# Patient Record
Sex: MALE | URBAN - METROPOLITAN AREA
[De-identification: names, ages, dates, MRNs, and addresses within clinical notes are randomized per-mention and may not be internally consistent; named-entity substitution may affect disease eponyms.]

---

## 2017-05-31 ENCOUNTER — TRANSFERRED RECORDS (OUTPATIENT)
Dept: HEALTH INFORMATION MANAGEMENT | Facility: CLINIC | Age: 82
End: 2017-05-31

## 2017-06-27 LAB — TROPONIN I BLD-MCNC: 1.59 UG/L

## 2017-06-28 ENCOUNTER — TRANSFERRED RECORDS (OUTPATIENT)
Dept: HEALTH INFORMATION MANAGEMENT | Facility: CLINIC | Age: 82
End: 2017-06-28

## 2017-06-29 ENCOUNTER — TRANSFERRED RECORDS (OUTPATIENT)
Dept: HEALTH INFORMATION MANAGEMENT | Facility: CLINIC | Age: 82
End: 2017-06-29

## 2017-08-28 LAB — BNP SERPL-MCNC: 725 PG/ML

## 2017-09-19 LAB — HERPES SIMPLEX TYPE 2 - QUEST: NORMAL

## 2017-09-25 LAB
HBA1C MFR BLD: 6.8 % (ref 0–5.7)
T4 FREE SERPL-MCNC: 1 NG/DL
TSH SERPL-ACNC: 5.53 MCU/ML

## 2017-09-26 LAB
ANION GAP SERPL CALCULATED.3IONS-SCNC: 9 MMOL/L
BASOPHILS NFR BLD AUTO: 0 %
BUN SERPL-MCNC: 27 MG/DL
CALCIUM SERPL-MCNC: 9.4 MG/DL
CHLORIDE SERPLBLD-SCNC: 103 MMOL/L
CHOLEST SERPL-MCNC: 103 MG/DL
CO2 SERPL-SCNC: 30 MMOL/L
CREAT SERPL-MCNC: 1.44 MG/DL
EOSINOPHIL NFR BLD AUTO: 4 %
ERYTHROCYTE [DISTWIDTH] IN BLOOD BY AUTOMATED COUNT: 13.1 %
GFR SERPL CREATININE-BSD FRML MDRD: 42 ML/MIN/1.73M2
GLUCOSE SERPL-MCNC: 112 MG/DL (ref 70–99)
HCT VFR BLD AUTO: 43.9 %
HDLC SERPL-MCNC: 28 MG/DL
HEMOGLOBIN: 14.6 G/DL (ref 13.3–17.7)
LDLC SERPL CALC-MCNC: 45 MG/DL
LYMPHOCYTES NFR BLD AUTO: 23 %
MCH RBC QN AUTO: 31.1 PG
MCHC RBC AUTO-ENTMCNC: 33.3 G/DL
MCV RBC AUTO: 93.6 FL
MONOCYTES NFR BLD AUTO: 10 %
NEUTROPHILS NFR BLD AUTO: 63 %
NONHDLC SERPL-MCNC: 75 MG/DL
PLATELET COUNT - QUEST: 192 10^9/L (ref 150–450)
POTASSIUM SERPL-SCNC: 4.4 MMOL/L
RBC # BLD AUTO: 4.69 10^12/L
SODIUM SERPL-SCNC: 142 MMOL/L
TRIGL SERPL-MCNC: 149 MG/DL
WBC # BLD AUTO: 7.5 10^9/L

## 2017-10-16 LAB — INR PPP: 1.8

## 2017-11-02 ENCOUNTER — TRANSFERRED RECORDS (OUTPATIENT)
Dept: HEALTH INFORMATION MANAGEMENT | Facility: CLINIC | Age: 82
End: 2017-11-02

## 2017-11-03 ENCOUNTER — TRANSFERRED RECORDS (OUTPATIENT)
Dept: HEALTH INFORMATION MANAGEMENT | Facility: CLINIC | Age: 82
End: 2017-11-03

## 2017-11-08 ENCOUNTER — TELEPHONE (OUTPATIENT)
Dept: CARDIOLOGY | Facility: CLINIC | Age: 82
End: 2017-11-08

## 2017-11-08 ENCOUNTER — PRE VISIT (OUTPATIENT)
Dept: CARDIOLOGY | Facility: CLINIC | Age: 82
End: 2017-11-08

## 2017-11-08 DIAGNOSIS — I25.10 CORONARY ARTERY DISEASE INVOLVING NATIVE HEART: Primary | ICD-10-CM

## 2017-11-08 DIAGNOSIS — I21.4 NSTEMI (NON-ST ELEVATED MYOCARDIAL INFARCTION) (H): ICD-10-CM

## 2017-11-08 DIAGNOSIS — I48.20 CHRONIC ATRIAL FIBRILLATION (H): ICD-10-CM

## 2017-11-08 DIAGNOSIS — Z95.5 STENTED CORONARY ARTERY: ICD-10-CM

## 2017-11-08 DIAGNOSIS — R06.02 SOB (SHORTNESS OF BREATH): ICD-10-CM

## 2017-11-08 PROBLEM — K21.9 ESOPHAGEAL REFLUX: Status: ACTIVE | Noted: 2017-11-08

## 2017-11-08 PROBLEM — E78.00 PURE HYPERCHOLESTEROLEMIA: Status: ACTIVE | Noted: 2017-11-08

## 2017-11-08 PROBLEM — I10 BENIGN ESSENTIAL HYPERTENSION: Status: ACTIVE | Noted: 2017-11-08

## 2017-11-08 PROBLEM — N18.30 CKD (CHRONIC KIDNEY DISEASE) STAGE 3, GFR 30-59 ML/MIN (H): Status: ACTIVE | Noted: 2017-11-08

## 2017-11-08 PROBLEM — Z92.89 HISTORY OF CARDIOVERSION: Status: ACTIVE | Noted: 2017-05-31

## 2017-11-08 PROBLEM — J45.909 REACTIVE AIRWAY DISEASE: Status: ACTIVE | Noted: 2017-11-08

## 2017-11-08 PROBLEM — R53.82 CHRONIC FATIGUE: Status: ACTIVE | Noted: 2017-11-08

## 2017-11-08 PROBLEM — Z86.73 HISTORY OF TIA (TRANSIENT ISCHEMIC ATTACK) AND STROKE: Status: ACTIVE | Noted: 2017-11-08

## 2017-11-08 PROBLEM — Z92.29 HISTORY OF COUMADIN THERAPY: Status: ACTIVE | Noted: 2017-11-08

## 2017-11-08 PROBLEM — J45.909 ASTHMA: Status: ACTIVE | Noted: 2017-11-08

## 2017-11-08 RX ORDER — METOPROLOL TARTRATE 25 MG/1
25 TABLET, FILM COATED ORAL 2 TIMES DAILY
COMMUNITY

## 2017-11-08 RX ORDER — MONTELUKAST SODIUM 10 MG/1
10 TABLET ORAL AT BEDTIME
COMMUNITY

## 2017-11-08 RX ORDER — DILTIAZEM HYDROCHLORIDE 120 MG/1
120 CAPSULE, EXTENDED RELEASE ORAL DAILY
COMMUNITY
End: 2017-11-08

## 2017-11-08 RX ORDER — ALBUTEROL SULFATE 90 UG/1
2 AEROSOL, METERED RESPIRATORY (INHALATION) EVERY 4 HOURS PRN
COMMUNITY

## 2017-11-08 RX ORDER — ATORVASTATIN CALCIUM 20 MG/1
20 TABLET, FILM COATED ORAL DAILY
COMMUNITY

## 2017-11-08 RX ORDER — NITROGLYCERIN 0.4 MG/1
0.4 TABLET SUBLINGUAL EVERY 5 MIN PRN
COMMUNITY

## 2017-11-08 RX ORDER — LEVOTHYROXINE SODIUM 50 UG/1
50 TABLET ORAL DAILY
COMMUNITY

## 2017-11-08 RX ORDER — CLOPIDOGREL BISULFATE 75 MG/1
75 TABLET ORAL DAILY
COMMUNITY
End: 2018-04-05

## 2017-11-08 RX ORDER — FUROSEMIDE 20 MG
20 TABLET ORAL DAILY
COMMUNITY

## 2017-11-08 RX ORDER — WARFARIN SODIUM 5 MG/1
5 TABLET ORAL DAILY
COMMUNITY

## 2017-11-09 NOTE — TELEPHONE ENCOUNTER
Patient being referred here by Dr. Roula Mcdonnell PCP and request of his wife who is a patient of Dr. Locke  Feliberto was  relatively healthy and active until June 2017 when he had NSTEMI, treated with coronary angiogram and 4 stents at [facility], in [place].  Since that time his primary problem has been increased SOB and fatigue. He has history of Pulm. Disease but he reports this was managed with his Inhalers.  When I spoke with him he stood up from his chair and walked a short distance to retreive med list. When he returned he was significantly SOB and difficulty talking.  He has Appointment on Nov. 21st with Dr. Locke. Records will be scanned into Epic and will have Echo films sent from [facility].

## 2017-11-21 ENCOUNTER — OFFICE VISIT (OUTPATIENT)
Dept: CARDIOLOGY | Facility: CLINIC | Age: 82
End: 2017-11-21
Attending: INTERNAL MEDICINE
Payer: MEDICARE

## 2017-11-21 VITALS
DIASTOLIC BLOOD PRESSURE: 61 MMHG | SYSTOLIC BLOOD PRESSURE: 116 MMHG | OXYGEN SATURATION: 95 % | HEART RATE: 63 BPM | WEIGHT: 158 LBS | HEIGHT: 72 IN | BODY MASS INDEX: 21.4 KG/M2

## 2017-11-21 DIAGNOSIS — I25.10 CORONARY ARTERY DISEASE INVOLVING NATIVE HEART: ICD-10-CM

## 2017-11-21 DIAGNOSIS — I48.20 CHRONIC ATRIAL FIBRILLATION (H): ICD-10-CM

## 2017-11-21 DIAGNOSIS — R06.02 SOB (SHORTNESS OF BREATH): ICD-10-CM

## 2017-11-21 DIAGNOSIS — I50.30 (HFPEF) HEART FAILURE WITH PRESERVED EJECTION FRACTION (H): Primary | ICD-10-CM

## 2017-11-21 DIAGNOSIS — I21.4 NSTEMI (NON-ST ELEVATED MYOCARDIAL INFARCTION) (H): ICD-10-CM

## 2017-11-21 DIAGNOSIS — I25.118 CORONARY ARTERY DISEASE OF NATIVE HEART WITH STABLE ANGINA PECTORIS, UNSPECIFIED VESSEL OR LESION TYPE (H): ICD-10-CM

## 2017-11-21 LAB
ALBUMIN SERPL-MCNC: 3.6 G/DL (ref 3.4–5)
ALP SERPL-CCNC: 93 U/L (ref 40–150)
ALT SERPL W P-5'-P-CCNC: 54 U/L (ref 0–70)
ANION GAP SERPL CALCULATED.3IONS-SCNC: 6 MMOL/L (ref 3–14)
AST SERPL W P-5'-P-CCNC: 30 U/L (ref 0–45)
BILIRUB SERPL-MCNC: 0.5 MG/DL (ref 0.2–1.3)
BUN SERPL-MCNC: 30 MG/DL (ref 7–30)
CALCIUM SERPL-MCNC: 8.6 MG/DL (ref 8.5–10.1)
CHLORIDE SERPL-SCNC: 105 MMOL/L (ref 94–109)
CO2 SERPL-SCNC: 31 MMOL/L (ref 20–32)
CREAT SERPL-MCNC: 1.46 MG/DL (ref 0.66–1.25)
ERYTHROCYTE [DISTWIDTH] IN BLOOD BY AUTOMATED COUNT: 13.3 % (ref 10–15)
GFR SERPL CREATININE-BSD FRML MDRD: 45 ML/MIN/1.7M2
GLUCOSE SERPL-MCNC: 110 MG/DL (ref 70–99)
HCT VFR BLD AUTO: 41.6 % (ref 40–53)
HGB BLD-MCNC: 13.5 G/DL (ref 13.3–17.7)
INR PPP: 1.84 (ref 0.86–1.14)
MAGNESIUM SERPL-MCNC: 2.3 MG/DL (ref 1.6–2.3)
MCH RBC QN AUTO: 31.5 PG (ref 26.5–33)
MCHC RBC AUTO-ENTMCNC: 32.5 G/DL (ref 31.5–36.5)
MCV RBC AUTO: 97 FL (ref 78–100)
NT-PROBNP SERPL-MCNC: 2479 PG/ML (ref 0–450)
PLATELET # BLD AUTO: 193 10E9/L (ref 150–450)
POTASSIUM SERPL-SCNC: 3.8 MMOL/L (ref 3.4–5.3)
PROT SERPL-MCNC: 7.2 G/DL (ref 6.8–8.8)
RBC # BLD AUTO: 4.28 10E12/L (ref 4.4–5.9)
SODIUM SERPL-SCNC: 142 MMOL/L (ref 133–144)
URATE SERPL-MCNC: 8.7 MG/DL (ref 3.5–7.2)
WBC # BLD AUTO: 6.9 10E9/L (ref 4–11)

## 2017-11-21 PROCEDURE — 83880 ASSAY OF NATRIURETIC PEPTIDE: CPT | Performed by: INTERNAL MEDICINE

## 2017-11-21 PROCEDURE — 93010 ELECTROCARDIOGRAM REPORT: CPT | Mod: ZP | Performed by: INTERNAL MEDICINE

## 2017-11-21 PROCEDURE — 36415 COLL VENOUS BLD VENIPUNCTURE: CPT | Performed by: INTERNAL MEDICINE

## 2017-11-21 PROCEDURE — 80053 COMPREHEN METABOLIC PANEL: CPT | Performed by: INTERNAL MEDICINE

## 2017-11-21 PROCEDURE — 83735 ASSAY OF MAGNESIUM: CPT | Performed by: INTERNAL MEDICINE

## 2017-11-21 PROCEDURE — 93005 ELECTROCARDIOGRAM TRACING: CPT | Mod: ZF

## 2017-11-21 PROCEDURE — 84550 ASSAY OF BLOOD/URIC ACID: CPT | Performed by: INTERNAL MEDICINE

## 2017-11-21 PROCEDURE — 99213 OFFICE O/P EST LOW 20 MIN: CPT | Mod: ZF

## 2017-11-21 PROCEDURE — 85027 COMPLETE CBC AUTOMATED: CPT | Performed by: INTERNAL MEDICINE

## 2017-11-21 PROCEDURE — 99204 OFFICE O/P NEW MOD 45 MIN: CPT | Mod: GC | Performed by: INTERNAL MEDICINE

## 2017-11-21 PROCEDURE — 85610 PROTHROMBIN TIME: CPT | Performed by: INTERNAL MEDICINE

## 2017-11-21 RX ORDER — SPIRONOLACTONE 25 MG/1
12.5 TABLET ORAL DAILY
Qty: 30 TABLET | Refills: 3 | Status: SHIPPED | OUTPATIENT
Start: 2017-11-21 | End: 2018-06-16

## 2017-11-21 RX ORDER — SPIRONOLACTONE 25 MG/1
12.5 TABLET ORAL DAILY
Qty: 30 TABLET | Refills: 0 | Status: SHIPPED | OUTPATIENT
Start: 2017-11-21 | End: 2017-11-21

## 2017-11-21 ASSESSMENT — PAIN SCALES - GENERAL: PAINLEVEL: NO PAIN (0)

## 2017-11-21 NOTE — MR AVS SNAPSHOT
After Visit Summary   11/21/2017    Feliberto Milligan    MRN: [id number]           Patient Information     Date Of Birth          10/10/1926        Visit Information        Provider Department      11/21/2017 10:00 AM Lucretia Locke MD [facility]        Today's Diagnoses     (HFpEF) heart failure with preserved ejection fraction (H)    -  1    Coronary artery disease of native heart with stable angina pectoris, unspecified vessel or lesion type (H)        NSTEMI (non-ST elevated myocardial infarction) (H)        Chronic atrial fibrillation (H)          Care Instructions    Return to see Dr. Locke in 5 months (please overbook with his wife)     Start spironolactone 12.5 mg daily (1/2 tab of 25 mg)     Side effects include breast tenderness or high potassium.     Obtain labs in 1 week.               Follow-ups after your visit        Follow-up notes from your care team     Return in about 5 months (around 4/21/2018).      Your next 10 appointments already scheduled     Apr 05, 2018  1:30 PM CDT   Lab with  LAB   [facility])    [address] [place] [id number] [phone]            Apr 05, 2018  3:30 PM CDT   (Arrive by 3:15 PM)   RETURN HEART FAILURE with Lucretia Locke MD   [facility] ([facility])    [address] [place] [id number] [phone]              Who to contact     If you have questions or need follow up information about today's clinic visit or your schedule please contact [facility] directly at [phone].  Normal or non-critical lab and imaging results will be communicated to you by MyChart, letter or phone within 4 business days after the clinic has received the results. If you do not hear from us within 7 days, please contact the clinic through MyChart or phone. If you have a critical or abnormal lab result, we will notify  "you by phone as soon as possible.  Submit refill requests through [employer] or call your pharmacy and they will forward the refill request to us. Please allow 3 business days for your refill to be completed.          Additional Information About Your Visit        [employer] mobile Information     [employer] lets you send messages to your doctor, view your test results, renew your prescriptions, schedule appointments and more. To sign up, go to [email or web address] . Click on \"Log in\" on the left side of the screen, which will take you to the Welcome page. Then click on \"Sign up Now\" on the right side of the page.     You will be asked to enter the access code listed below, as well as some personal information. Please follow the directions to create your username and password.     Your access code is: [email or web address]  Expires: 2018  6:30 AM     Your access code will  in 90 days. If you need help or a new code, please call your [facility] clinic or [phone].        Care EveryWhere ID     This is your Care EveryWhere ID. This could be used by other organizations to access your [facility] medical records  [id number]        Your Vitals Were     Pulse Height Pulse Oximetry BMI (Body Mass Index)          63 1.829 m (6') 95% 21.43 kg/m2         Blood Pressure from Last 3 Encounters:   17 116/61    Weight from Last 3 Encounters:   17 71.7 kg (158 lb)              We Performed the Following     Basic metabolic panel     EKG 12-lead, tracing only (Future)          Today's Medication Changes          These changes are accurate as of: 17 11:57 AM.  If you have any questions, ask your nurse or doctor.               Start taking these medicines.        Dose/Directions    spironolactone 25 MG tablet   Commonly known as:  ALDACTONE   Used for:  (HFpEF) heart failure with preserved ejection fraction (H)   Started by:  Lucretia Locke MD        Dose:  12.5 mg   Take 0.5 tablets (12.5 mg) by mouth daily "   Quantity:  30 tablet   Refills:  3            Where to get your medicines      These medications were sent to [facility] [address] [place] [address]), [place] - [address] ([place]) [place] [address]     Phone:  [phone]     spironolactone 25 MG tablet                Primary Care Provider    None Specified       No primary provider on file.        Equal Access to Services     [facility]: Hadii aad ku hadasho Soomaali, waaxda luqadaha, qaybta kaalmada adeegyada, waxay idiin hayaan adeeg khyolish lagrace ah. So [facility] [phone].    ATENCIÓN: Si habla español, tiene a nguyen disposición servicios gratuitos de asistencia lingüística. Zainab al [phone].    We comply with applicable federal civil rights laws and [place] laws. We do not discriminate on the basis of race, color, national origin, age, disability, sex, sexual orientation, or gender identity.            Thank you!     Thank you for choosing [facility]  for your care. Our goal is always to provide you with excellent care. Hearing back from our patients is one way we can continue to improve our services. Please take a few minutes to complete the written survey that you may receive in the mail after your visit with us. Thank you!             Your Updated Medication List - Protect others around you: Learn how to safely use, store and throw away your medicines at www.disposemymeds.org.          This list is accurate as of: 11/21/17 11:57 AM.  Always use your most recent med list.                   Brand Name Dispense Instructions for use Diagnosis    ADVAIR DISKUS 500-50 MCG/DOSE diskus inhaler   Generic drug:  fluticasone-salmeterol      Inhale 1 puff into the lungs every 12 hours    Coronary artery disease involving native heart, Stented coronary artery, SOB (shortness of breath), NSTEMI (non-ST elevated myocardial infarction) (H)       LASIX 20 MG tablet   Generic drug:  furosemide      Take 20 mg by  mouth daily    Coronary artery disease involving native heart, Stented coronary artery, SOB (shortness of breath), NSTEMI (non-ST elevated myocardial infarction) (H)       levothyroxine 50 MCG tablet    SYNTHROID/LEVOTHROID     Take 50 mcg by mouth daily    Coronary artery disease involving native heart, Stented coronary artery, SOB (shortness of breath), NSTEMI (non-ST elevated myocardial infarction) (H)       LIPITOR 20 MG tablet   Generic drug:  atorvastatin      Take 20 mg by mouth daily    Coronary artery disease involving native heart, Stented coronary artery, SOB (shortness of breath), NSTEMI (non-ST elevated myocardial infarction) (H)       metoprolol 25 MG tablet    LOPRESSOR     Take 25 mg by mouth 2 times daily    Coronary artery disease involving native heart, Stented coronary artery, SOB (shortness of breath), NSTEMI (non-ST elevated myocardial infarction) (H)       MULTIVITAMIN ADULT PO      Take 1 tablet by mouth daily    Coronary artery disease involving native heart, Stented coronary artery, SOB (shortness of breath), NSTEMI (non-ST elevated myocardial infarction) (H)       NITROSTAT 0.4 MG sublingual tablet   Generic drug:  nitroGLYcerin      Place 0.4 mg under the tongue every 5 minutes as needed for chest pain For chest pain place 1 tablet under the tongue every 5 minutes for 3 doses. If symptoms persist 5 minutes after 1st dose call 911.    Coronary artery disease involving native heart, Stented coronary artery, SOB (shortness of breath), NSTEMI (non-ST elevated myocardial infarction) (H)       PLAVIX 75 MG tablet   Generic drug:  clopidogrel      Take 75 mg by mouth daily    Coronary artery disease involving native heart, Stented coronary artery, SOB (shortness of breath), NSTEMI (non-ST elevated myocardial infarction) (H)       SINGULAIR 10 MG tablet   Generic drug:  montelukast      Take 10 mg by mouth At Bedtime    Coronary artery disease involving native heart, Stented coronary artery, SOB  (shortness of breath), NSTEMI (non-ST elevated myocardial infarction) (H)       spironolactone 25 MG tablet    ALDACTONE    30 tablet    Take 0.5 tablets (12.5 mg) by mouth daily    (HFpEF) heart failure with preserved ejection fraction (H)       VENTOLIN  (90 BASE) MCG/ACT Inhaler   Generic drug:  albuterol      Inhale 2 puffs into the lungs every 4 hours as needed for shortness of breath / dyspnea or wheezing    Coronary artery disease involving native heart, Stented coronary artery, SOB (shortness of breath), NSTEMI (non-ST elevated myocardial infarction) (H)       warfarin 5 MG tablet    COUMADIN     Take 5 mg by mouth daily Adjusted per INR clinic    Coronary artery disease involving native heart, Stented coronary artery, SOB (shortness of breath), NSTEMI (non-ST elevated myocardial infarction) (H)       ZANTAC 150 MG tablet   Generic drug:  ranitidine      Take 150 mg by mouth 2 times daily    Coronary artery disease involving native heart, Stented coronary artery, SOB (shortness of breath), NSTEMI (non-ST elevated myocardial infarction) (H)

## 2017-11-21 NOTE — PROGRESS NOTES
November 21, 2017    Dear Roula Mcdonnell,    We had the pleasure of seeing Mr. Feliberto Milligan in the [facility] today.    As you know, Mr. Milligan is a 89 year old male with history of atrial fibrillation on anticoagulation with warfarin, CKD stage 3, hyperlipidemia and known coronary artery disease.  In late June of 2017, he developed chest pain.  He presented to [facility] and was found to have an NSTEMI.  He underwent coronary angiography that demonstrated several blockages for which he received x4 LUCIANO (stented vessels include LAD, OM2 and x2 to the RPDA).  Follow up echocardiogram after that time is detailed below but demonstrated apical hypokinesis with a low-normal EF of 50%.  Since that time, he has been having weight loss and progressive decline in his functional status.    He states today that he has a long-standing history of asthma and bronchitis.  He takes his Advair inhaler daily and his albuterol inhaler as needed.  Over the course of the past year he has had worsening shortness of breath with exertion.  He denies any current chest pain, rarely takes his nitroglycerin and did not have symptoms of shortness of breath as part of his angina that he had in June of 2017.    From a volume standpoint he does not note swelling, early satiety, abdominal distention.  Weight is decreasing over time but says it is due to him not eating as much around the home.  He denies palpitations, lightheadedness, syncope.  No orthopnea or PND.  Sleeps flat and well without symptoms.  Denies any cough.      PAST MEDICAL HISTORY:  Past Medical History:   Diagnosis Date     Asthma 11/8/2017     Atrial fibrillation (H)      Benign essential hypertension 11/8/2017     Chronic fatigue 11/8/2017     CKD (chronic kidney disease) stage 3, GFR 30-59 ml/min 11/8/2017     Coronary artery disease involving native heart 11/8/2017     Esophageal reflux 11/8/2017     History of cardioversion 5/31/2017      History of Coumadin therapy 11/8/2017     History of TIA (transient ischemic attack) and stroke/ remote 11/8/2017     Hypothyroidism 2/8/2016     NSTEMI (non-ST elevated myocardial infarction) (H) 6/26/2017     Pure hypercholesterolemia 11/8/2017     Reactive airway disease 11/8/2017     SOB (shortness of breath) 11/8/2017     Stented coronary artery/ mid LAD,OM2, PDA, all LUCIANO 6/29/2017       FAMILY HISTORY:  Mother's side with significant coronary disease    SOCIAL HISTORY:  , never smoker.  No significant EtOH.  Retired.  Career in business.      CURRENT MEDICATIONS:  Current Outpatient Prescriptions   Medication Sig Dispense Refill     albuterol (VENTOLIN HFA) 108 (90 BASE) MCG/ACT Inhaler Inhale 2 puffs into the lungs every 4 hours as needed for shortness of breath / dyspnea or wheezing       atorvastatin (LIPITOR) 20 MG tablet Take 20 mg by mouth daily       clopidogrel (PLAVIX) 75 MG tablet Take 75 mg by mouth daily       fluticasone-salmeterol (ADVAIR DISKUS) 500-50 MCG/DOSE diskus inhaler Inhale 1 puff into the lungs every 12 hours       furosemide (LASIX) 20 MG tablet Take 20 mg by mouth daily       levothyroxine (SYNTHROID/LEVOTHROID) 50 MCG tablet Take 50 mcg by mouth daily       metoprolol (LOPRESSOR) 25 MG tablet Take 25 mg by mouth 2 times daily       montelukast (SINGULAIR) 10 MG tablet Take 10 mg by mouth At Bedtime       Multiple Vitamins-Minerals (MULTIVITAMIN ADULT PO) Take 1 tablet by mouth daily       nitroGLYcerin (NITROSTAT) 0.4 MG sublingual tablet Place 0.4 mg under the tongue every 5 minutes as needed for chest pain For chest pain place 1 tablet under the tongue every 5 minutes for 3 doses. If symptoms persist 5 minutes after 1st dose call 911.       ranitidine (ZANTAC) 150 MG tablet Take 150 mg by mouth 2 times daily       warfarin (COUMADIN) 5 MG tablet Take 5 mg by mouth daily Adjusted per INR clinic         ROS:   10 point ROS negative except HPI    EXAM:  /61 (BP Location:  Left arm, Patient Position: Chair, Cuff Size: Adult Regular)  Pulse 63  Ht 1.829 m (6')  Wt 71.7 kg (158 lb)  SpO2 95%  BMI 21.43 kg/m2  General: appears comfortable, alert and articulate  Head: normocephalic, atraumatic  Eyes: anicteric sclera, EOMI  Neck: no adenopathy  Orophyarynx: moist mucosa, no lesions, dentition intact  Heart: regular, S1/S2, no murmur, gallop, rub, estimated JVP is just above the level of the clavicle  Lungs: clear, no rales or wheezing  Abdomen: soft, non-tender, bowel sounds present, no hepatosplenomegaly  Extremities: no clubbing, cyanosis or edema  Neurological: normal speech and affect, no gross motor deficits    Labs:  CBC RESULTS:  Lab Results   Component Value Date    WBC 6.9 11/21/2017    RBC 4.28 (L) 11/21/2017    HGB 13.5 11/21/2017    HCT 41.6 11/21/2017    MCV 97 11/21/2017    MCH 31.5 11/21/2017    MCHC 32.5 11/21/2017    RDW 13.3 11/21/2017     11/21/2017       CMP RESULTS:  Lab Results   Component Value Date     09/26/2017    POTASSIUM 4.4 09/26/2017    CHLORIDE 103 09/26/2017    CO2 30 09/26/2017    ANIONGAP 9 09/26/2017     (A) 09/26/2017    BUN 27 09/26/2017    CR 1.44 09/26/2017    GFRESTIMATED 42 09/26/2017    BALBIR 9.4 09/26/2017        Echocardiogram:  Done at [facility], EF 50%, hypokinesis of the apical wall.  Moderate LA dilation, mild RA dilation, no significant stenosis of the aortic valve.  Moderate MR, RVSP was 40.  IVC dilated with respiratory variation, RAP estimated 8 mmHg.  LV diastolic diameter is 4 cm.    6/29/17: Coronary Angiogram:  PCI of the mid-LAD, OM2 and PDA with LUCIANO.  This was done at [facility].    Holter: 9/2017  No e/o arrhythmias aside from known afib that is rate controlled, done at [facility].    EKG 11/21/17:  Rate controlled afib    Spirometry done at [facility]:  SPIROMETRY / FARZANEH 10/20/2016 11/1/2016 1/31/2017 3/28/2017   FARZANEH 99 71 25 33   FVC Best 2.64 2.44 2.85 2.62   FVC % 61 65 76 63   FEV1 Best 1.45 1.54 1.75 1.69   FEV1  % 48 60 68 59   FEV1/FVC 54.9 63.1 61.4 64.5     Assessment and Plan:   Mr. Milligan is a 89-year-old male with coronary artery disease, NSTEMI over summer of 2017, low-normal EF at 50% with apical hypokinesis who presents today for additional evaluation of progressive symptoms after his event this summer.  Overall, his clinical picture (RVSP, LA dilation, elevated BNP) are all consistent with HFpEF.  Today, his volume status is near euvolemic.  He is NYHA class III. The plan at this point is volume control, blood pressure control, rate control, and addition of aldactone given the results of the TOP CAT trial.     HFpEF with underlying CAD  ACEi: No, 2/2 CKD  Diuretic:  Lasix 20 mg daily   Aldosterone Antagonist:  Add Aldactone 12.5 mg daily today, chemistry panel in a week  Statin: Lipitor  ASA: No, since he is on warfarin  P2Y12:  Plavix, once he is 12 months out he will stop this and go to ASA 81 mg daily    Atrial Fibrillation:  Rate control method, Lopressor with anticoagulation with warfarin.  Currently no issues.    Seen with cardiology fellow, Sadi Edouard    I have seen and examined the patient with the house staff on November 21, 2017 and agree with the outlined assessment and plan.      Lucretia Locke MD   of Medicine   [facility] Division of Cardiology         CC: Roula Mcdonnell MD [facility]

## 2017-11-21 NOTE — LETTER
11/21/2017      RE: Feliberto Milligan  [address] [address]       Dear Colleague,    Thank you for the opportunity to participate in the care of your patient, Feliberto Milligan, at the [facility] HEART [facility]. Please see a copy of my visit note below.    November 21, 2017    Dear Roula Mcdonnell,    We had the pleasure of seeing Mr. Feliberto Milligan in the [facility] today.    As you know, Mr. Milligan is a 89 year old male with history of atrial fibrillation on anticoagulation with warfarin, CKD stage 3, hyperlipidemia and known coronary artery disease.  In late June of 2017, he developed chest pain.  He presented to [facility] and was found to have an NSTEMI.  He underwent coronary angiography that demonstrated several blockages for which he received x4 LUCIANO (stented vessels include LAD, OM2 and x2 to the RPDA).  Follow up echocardiogram after that time is detailed below but demonstrated apical hypokinesis with a low-normal EF of 50%.  Since that time, he has been having weight loss and progressive decline in his functional status.    He states today that he has a long-standing history of asthma and bronchitis.  He takes his Advair inhaler daily and his albuterol inhaler as needed.  Over the course of the past year he has had worsening shortness of breath with exertion.  He denies any current chest pain, rarely takes his nitroglycerin and did not have symptoms of shortness of breath as part of his angina that he had in June of 2017.    From a volume standpoint he does not note swelling, early satiety, abdominal distention.  Weight is decreasing over time but says it is due to him not eating as much around the home.  He denies palpitations, lightheadedness, syncope.  No orthopnea or PND.  Sleeps flat and well without symptoms.  Denies any cough.      PAST MEDICAL HISTORY:  Past Medical History:   Diagnosis Date     Asthma 11/8/2017      Atrial fibrillation (H)      Benign essential hypertension 11/8/2017     Chronic fatigue 11/8/2017     CKD (chronic kidney disease) stage 3, GFR 30-59 ml/min 11/8/2017     Coronary artery disease involving native heart 11/8/2017     Esophageal reflux 11/8/2017     History of cardioversion 5/31/2017     History of Coumadin therapy 11/8/2017     History of TIA (transient ischemic attack) and stroke/ remote 11/8/2017     Hypothyroidism 2/8/2016     NSTEMI (non-ST elevated myocardial infarction) (H) 6/26/2017     Pure hypercholesterolemia 11/8/2017     Reactive airway disease 11/8/2017     SOB (shortness of breath) 11/8/2017     Stented coronary artery/ mid LAD,OM2, PDA, all LUCIANO 6/29/2017       FAMILY HISTORY:  Mother's side with significant coronary disease    SOCIAL HISTORY:  , never smoker.  No significant EtOH.  Retired.  Career in business.      CURRENT MEDICATIONS:  Current Outpatient Prescriptions   Medication Sig Dispense Refill     albuterol (VENTOLIN HFA) 108 (90 BASE) MCG/ACT Inhaler Inhale 2 puffs into the lungs every 4 hours as needed for shortness of breath / dyspnea or wheezing       atorvastatin (LIPITOR) 20 MG tablet Take 20 mg by mouth daily       clopidogrel (PLAVIX) 75 MG tablet Take 75 mg by mouth daily       fluticasone-salmeterol (ADVAIR DISKUS) 500-50 MCG/DOSE diskus inhaler Inhale 1 puff into the lungs every 12 hours       furosemide (LASIX) 20 MG tablet Take 20 mg by mouth daily       levothyroxine (SYNTHROID/LEVOTHROID) 50 MCG tablet Take 50 mcg by mouth daily       metoprolol (LOPRESSOR) 25 MG tablet Take 25 mg by mouth 2 times daily       montelukast (SINGULAIR) 10 MG tablet Take 10 mg by mouth At Bedtime       Multiple Vitamins-Minerals (MULTIVITAMIN ADULT PO) Take 1 tablet by mouth daily       nitroGLYcerin (NITROSTAT) 0.4 MG sublingual tablet Place 0.4 mg under the tongue every 5 minutes as needed for chest pain For chest pain place 1 tablet under the tongue every 5 minutes for 3  doses. If symptoms persist 5 minutes after 1st dose call 911.       ranitidine (ZANTAC) 150 MG tablet Take 150 mg by mouth 2 times daily       warfarin (COUMADIN) 5 MG tablet Take 5 mg by mouth daily Adjusted per INR clinic         ROS:   10 point ROS negative except HPI    EXAM:  /61 (BP Location: Left arm, Patient Position: Chair, Cuff Size: Adult Regular)  Pulse 63  Ht 1.829 m (6')  Wt 71.7 kg (158 lb)  SpO2 95%  BMI 21.43 kg/m2  General: appears comfortable, alert and articulate  Head: normocephalic, atraumatic  Eyes: anicteric sclera, EOMI  Neck: no adenopathy  Orophyarynx: moist mucosa, no lesions, dentition intact  Heart: regular, S1/S2, no murmur, gallop, rub, estimated JVP is just above the level of the clavicle  Lungs: clear, no rales or wheezing  Abdomen: soft, non-tender, bowel sounds present, no hepatosplenomegaly  Extremities: no clubbing, cyanosis or edema  Neurological: normal speech and affect, no gross motor deficits    Labs:  CBC RESULTS:  Lab Results   Component Value Date    WBC 6.9 11/21/2017    RBC 4.28 (L) 11/21/2017    HGB 13.5 11/21/2017    HCT 41.6 11/21/2017    MCV 97 11/21/2017    MCH 31.5 11/21/2017    MCHC 32.5 11/21/2017    RDW 13.3 11/21/2017     11/21/2017       CMP RESULTS:  Lab Results   Component Value Date     09/26/2017    POTASSIUM 4.4 09/26/2017    CHLORIDE 103 09/26/2017    CO2 30 09/26/2017    ANIONGAP 9 09/26/2017     (A) 09/26/2017    BUN 27 09/26/2017    CR 1.44 09/26/2017    GFRESTIMATED 42 09/26/2017    BALBIR 9.4 09/26/2017        Echocardiogram:  Done at [facility], EF 50%, hypokinesis of the apical wall.  Moderate LA dilation, mild RA dilation, no significant stenosis of the aortic valve.  Moderate MR, RVSP was 40.  IVC dilated with respiratory variation, RAP estimated 8 mmHg.  LV diastolic diameter is 4 cm.    6/29/17: Coronary Angiogram:  PCI of the mid-LAD, OM2 and PDA with LUCIANO.  This was done at [facility].    Holter: 9/2017  No e/o  arrhythmias aside from known afib that is rate controlled, done at [facility].    EKG 11/21/17:  Rate controlled afib    Spirometry done at [facility]:  SPIROMETRY / FARZANEH 10/20/2016 11/1/2016 1/31/2017 3/28/2017   FARZANEH 99 71 25 33   FVC Best 2.64 2.44 2.85 2.62   FVC % 61 65 76 63   FEV1 Best 1.45 1.54 1.75 1.69   FEV1 % 48 60 68 59   FEV1/FVC 54.9 63.1 61.4 64.5     Assessment and Plan:   Mr. Milligan is a 89-year-old male with coronary artery disease, NSTEMI over summer of 2017, low-normal EF at 50% with apical hypokinesis who presents today for additional evaluation of progressive symptoms after his event this summer.  Overall, his clinical picture (RVSP, LA dilation, elevated BNP) are all consistent with HFpEF.  Today, his volume status is near euvolemic.  He is NYHA class III. The plan at this point is volume control, blood pressure control, rate control, and addition of aldactone given the results of the TOP CAT trial.     HFpEF with underlying CAD  ACEi: No, 2/2 CKD  Diuretic:  Lasix 20 mg daily   Aldosterone Antagonist:  Add Aldactone 12.5 mg daily today, chemistry panel in a week  Statin: Lipitor  ASA: No, since he is on warfarin  P2Y12:  Plavix, once he is 12 months out he will stop this and go to ASA 81 mg daily    Atrial Fibrillation:  Rate control method, Lopressor with anticoagulation with warfarin.  Currently no issues.    Seen with cardiology fellow, Sadi Edouard    I have seen and examined the patient with the house staff on November 21, 2017 and agree with the outlined assessment and plan.      Lucretia Locke MD   of Medicine   [facility] Division of Cardiology         CC: Roula Mcdonnell MD [facility]

## 2017-11-21 NOTE — PATIENT INSTRUCTIONS
Return to see Dr. Locke in 5 months (please overbook with his wife)     Start spironolactone 12.5 mg daily (1/2 tab of 25 mg)     Side effects include breast tenderness or high potassium.     Obtain labs in 1 week.

## 2017-11-21 NOTE — NURSING NOTE
Chief Complaint   Patient presents with     New Patient     Ref. Roula Mcdonnell MD for eval of new HF/ SOB, EKG, labs today     Vitals were taken and medications were reconciled.    Dodie Martinez CMA     10:12 AM

## 2018-04-03 ENCOUNTER — PRE VISIT (OUTPATIENT)
Dept: CARDIOLOGY | Facility: CLINIC | Age: 83
End: 2018-04-03

## 2018-04-03 DIAGNOSIS — I25.118 CORONARY ARTERY DISEASE OF NATIVE HEART WITH STABLE ANGINA PECTORIS, UNSPECIFIED VESSEL OR LESION TYPE (H): ICD-10-CM

## 2018-04-03 DIAGNOSIS — I10 BENIGN ESSENTIAL HYPERTENSION: Primary | ICD-10-CM

## 2018-04-05 ENCOUNTER — OFFICE VISIT (OUTPATIENT)
Dept: CARDIOLOGY | Facility: CLINIC | Age: 83
End: 2018-04-05
Attending: INTERNAL MEDICINE
Payer: MEDICARE

## 2018-04-05 VITALS
SYSTOLIC BLOOD PRESSURE: 134 MMHG | OXYGEN SATURATION: 96 % | BODY MASS INDEX: 22.42 KG/M2 | WEIGHT: 165.5 LBS | HEIGHT: 72 IN | HEART RATE: 51 BPM | DIASTOLIC BLOOD PRESSURE: 73 MMHG

## 2018-04-05 DIAGNOSIS — I21.4 NSTEMI (NON-ST ELEVATED MYOCARDIAL INFARCTION) (H): ICD-10-CM

## 2018-04-05 DIAGNOSIS — I25.118 CORONARY ARTERY DISEASE OF NATIVE HEART WITH STABLE ANGINA PECTORIS, UNSPECIFIED VESSEL OR LESION TYPE (H): ICD-10-CM

## 2018-04-05 DIAGNOSIS — I10 BENIGN ESSENTIAL HYPERTENSION: ICD-10-CM

## 2018-04-05 DIAGNOSIS — R06.02 SOB (SHORTNESS OF BREATH): ICD-10-CM

## 2018-04-05 DIAGNOSIS — Z95.5 STENTED CORONARY ARTERY: ICD-10-CM

## 2018-04-05 LAB
ANION GAP SERPL CALCULATED.3IONS-SCNC: 5 MMOL/L (ref 3–14)
BUN SERPL-MCNC: 20 MG/DL (ref 7–30)
CALCIUM SERPL-MCNC: 8.7 MG/DL (ref 8.5–10.1)
CHLORIDE SERPL-SCNC: 108 MMOL/L (ref 94–109)
CO2 SERPL-SCNC: 29 MMOL/L (ref 20–32)
CREAT SERPL-MCNC: 1.29 MG/DL (ref 0.66–1.25)
GFR SERPL CREATININE-BSD FRML MDRD: 52 ML/MIN/1.7M2
GLUCOSE SERPL-MCNC: 102 MG/DL (ref 70–99)
POTASSIUM SERPL-SCNC: 4 MMOL/L (ref 3.4–5.3)
SODIUM SERPL-SCNC: 142 MMOL/L (ref 133–144)

## 2018-04-05 PROCEDURE — G0463 HOSPITAL OUTPT CLINIC VISIT: HCPCS

## 2018-04-05 PROCEDURE — 36415 COLL VENOUS BLD VENIPUNCTURE: CPT | Performed by: INTERNAL MEDICINE

## 2018-04-05 PROCEDURE — 80048 BASIC METABOLIC PNL TOTAL CA: CPT | Performed by: INTERNAL MEDICINE

## 2018-04-05 PROCEDURE — 99214 OFFICE O/P EST MOD 30 MIN: CPT | Mod: ZP | Performed by: INTERNAL MEDICINE

## 2018-04-05 RX ORDER — CLOPIDOGREL BISULFATE 75 MG/1
75 TABLET ORAL DAILY
Qty: 30 TABLET | COMMUNITY
Start: 2018-04-05

## 2018-04-05 ASSESSMENT — PAIN SCALES - GENERAL: PAINLEVEL: NO PAIN (0)

## 2018-04-05 NOTE — PATIENT INSTRUCTIONS
No medications changes for now  Stop Plavix after June 29th, start Aspirin 81 mg   Follow up in one yr, or sooner if needed with Dr. Cornelia Dumont, RN  Cardiology Care Coordinator  Please be aware that I work part-time but I will be checking messages several times per week.   For urgent needs, please call the number below.    [phone], press 1 for [employer], press 3 to speak to a nurse    .

## 2018-04-05 NOTE — NURSING NOTE
Chief Complaint   Patient presents with     Follow Up For     5 mo. follow up for HFpEF/ labs     Vitals were taken and medications were reconciled.    UNRULY Coon  3:04 PM

## 2018-04-05 NOTE — MR AVS SNAPSHOT
After Visit Summary   4/5/2018    Feliberto Milligan    MRN: [id number]           Patient Information     Date Of Birth          10/10/1926        Visit Information        Provider Department      4/5/2018 3:30 PM Lucretia Locke MD [facility]        Today's Diagnoses     Coronary artery disease involving native heart        Stented coronary artery/ mid LAD,OM2, PDA, all LUCIANO        SOB (shortness of breath)        NSTEMI (non-ST elevated myocardial infarction) (H)          Care Instructions    No medications changes for now  Stop Plavix after June 29th, start Aspirin 81 mg   Follow up in one yr, or sooner if needed with Dr. Cornelia Dumont, RN  Cardiology Care Coordinator  Please be aware that I work part-time but I will be checking messages several times per week.   For urgent needs, please call the number below.    [phone], press 1 for [employer], press 3 to speak to a nurse    .            Follow-ups after your visit        Additional Services     Follow-Up with Advanced Heart Failure Cardiologist                 Future tests that were ordered for you today     Open Future Orders        Priority Expected Expires Ordered    Follow-Up with Advanced Heart Failure Cardiologist Routine 4/1/2019 7/11/2019 4/5/2018    EKG 12-lead, tracing only (Future) Routine 4/5/2018 8/3/2018 4/5/2018            Who to contact     If you have questions or need follow up information about today's clinic visit or your schedule please contact [facility] directly at [phone].  Normal or non-critical lab and imaging results will be communicated to you by MyChart, letter or phone within 4 business days after the clinic has received the results. If you do not hear from us within 7 days, please contact the clinic through MyChart or phone. If you have a critical or abnormal lab result, we will notify you by phone as soon as possible.  Submit refill requests through [employer] or call your  "pharmacy and they will forward the refill request to us. Please allow 3 business days for your refill to be completed.          Additional Information About Your Visit        MyChart Information     [employer] lets you send messages to your doctor, view your test results, renew your prescriptions, schedule appointments and more. To sign up, go to [email or web address] . Click on \"Log in\" on the left side of the screen, which will take you to the Welcome page. Then click on \"Sign up Now\" on the right side of the page.     You will be asked to enter the access code listed below, as well as some personal information. Please follow the directions to create your username and password.     Your access code is: [email or web address]  Expires: 2018  6:30 AM     Your access code will  in 90 days. If you need help or a new code, please call your [facility] clinic or [phone].        Care EveryWhere ID     This is your Care EveryWhere ID. This could be used by other organizations to access your [facility] medical records  [id number]        Your Vitals Were     Pulse Height Pulse Oximetry BMI (Body Mass Index)          51 1.829 m (6') 96% 22.45 kg/m2         Blood Pressure from Last 3 Encounters:   18 134/73   17 116/61    Weight from Last 3 Encounters:   18 75.1 kg (165 lb 8 oz)   17 71.7 kg (158 lb)              We Performed the Following     Echocardiogram Complete          Today's Medication Changes          These changes are accurate as of 18  4:12 PM.  If you have any questions, ask your nurse or doctor.               These medicines have changed or have updated prescriptions.        Dose/Directions    PLAVIX 75 MG tablet   This may have changed:  additional instructions   Used for:  Coronary artery disease involving native heart, Stented coronary artery, SOB (shortness of breath), NSTEMI (non-ST elevated myocardial infarction) (H)   Generic drug:  clopidogrel   Changed by:  Cornelia " Lucretia Tracey MD        Dose:  75 mg   Take 1 tablet (75 mg) by mouth daily Stop after June 29th 2018, start Aspirin 81 mg   Quantity:  30 tablet   Refills:  0                Primary Care Provider    None Specified       No primary provider on file.        Equal Access to Services     MARY HOYT : Katy gael rodriguez sosa Charles, washitalda luqadaha, qaybta kaalmada cornelia, jaycob grullon laHaileynabila tj. So [facility] [phone].    ATENCIÓN: Si habla español, tiene a nguyen disposición servicios gratuitos de asistencia lingüística. Llame al [phone].    We comply with applicable federal civil rights laws and [place] laws. We do not discriminate on the basis of race, color, national origin, age, disability, sex, sexual orientation, or gender identity.            Thank you!     Thank you for choosing [facility]  for your care. Our goal is always to provide you with excellent care. Hearing back from our patients is one way we can continue to improve our services. Please take a few minutes to complete the written survey that you may receive in the mail after your visit with us. Thank you!             Your Updated Medication List - Protect others around you: Learn how to safely use, store and throw away your medicines at www.disposemymeds.org.          This list is accurate as of 4/5/18  4:12 PM.  Always use your most recent med list.                   Brand Name Dispense Instructions for use Diagnosis    ADVAIR DISKUS 500-50 MCG/DOSE diskus inhaler   Generic drug:  fluticasone-salmeterol      Inhale 1 puff into the lungs every 12 hours    Coronary artery disease involving native heart, Stented coronary artery, SOB (shortness of breath), NSTEMI (non-ST elevated myocardial infarction) (H)       LASIX 20 MG tablet   Generic drug:  furosemide      Take 20 mg by mouth daily    Coronary artery disease involving native heart, Stented coronary artery, SOB (shortness of breath), NSTEMI (non-ST elevated  myocardial infarction) (H)       levothyroxine 50 MCG tablet    SYNTHROID/LEVOTHROID     Take 50 mcg by mouth daily    Coronary artery disease involving native heart, Stented coronary artery, SOB (shortness of breath), NSTEMI (non-ST elevated myocardial infarction) (H)       LIPITOR 20 MG tablet   Generic drug:  atorvastatin      Take 20 mg by mouth daily    Coronary artery disease involving native heart, Stented coronary artery, SOB (shortness of breath), NSTEMI (non-ST elevated myocardial infarction) (H)       metoprolol tartrate 25 MG tablet    LOPRESSOR     Take 25 mg by mouth 2 times daily    Coronary artery disease involving native heart, Stented coronary artery, SOB (shortness of breath), NSTEMI (non-ST elevated myocardial infarction) (H)       MULTIVITAMIN ADULT PO      Take 1 tablet by mouth daily    Coronary artery disease involving native heart, Stented coronary artery, SOB (shortness of breath), NSTEMI (non-ST elevated myocardial infarction) (H)       NITROSTAT 0.4 MG sublingual tablet   Generic drug:  nitroGLYcerin      Place 0.4 mg under the tongue every 5 minutes as needed for chest pain For chest pain place 1 tablet under the tongue every 5 minutes for 3 doses. If symptoms persist 5 minutes after 1st dose call 911.    Coronary artery disease involving native heart, Stented coronary artery, SOB (shortness of breath), NSTEMI (non-ST elevated myocardial infarction) (H)       PLAVIX 75 MG tablet   Generic drug:  clopidogrel     30 tablet    Take 1 tablet (75 mg) by mouth daily Stop after June 29th 2018, start Aspirin 81 mg    Coronary artery disease involving native heart, Stented coronary artery, SOB (shortness of breath), NSTEMI (non-ST elevated myocardial infarction) (H)       SINGULAIR 10 MG tablet   Generic drug:  montelukast      Take 10 mg by mouth At Bedtime    Coronary artery disease involving native heart, Stented coronary artery, SOB (shortness of breath), NSTEMI (non-ST elevated myocardial  infarction) (H)       spironolactone 25 MG tablet    ALDACTONE    30 tablet    Take 0.5 tablets (12.5 mg) by mouth daily    (HFpEF) heart failure with preserved ejection fraction (H)       VENTOLIN  (90 BASE) MCG/ACT Inhaler   Generic drug:  albuterol      Inhale 2 puffs into the lungs every 4 hours as needed for shortness of breath / dyspnea or wheezing    Coronary artery disease involving native heart, Stented coronary artery, SOB (shortness of breath), NSTEMI (non-ST elevated myocardial infarction) (H)       warfarin 5 MG tablet    COUMADIN     Take 5 mg by mouth daily Adjusted per INR clinic    Coronary artery disease involving native heart, Stented coronary artery, SOB (shortness of breath), NSTEMI (non-ST elevated myocardial infarction) (H)       ZANTAC 150 MG tablet   Generic drug:  ranitidine      Take 150 mg by mouth 2 times daily    Coronary artery disease involving native heart, Stented coronary artery, SOB (shortness of breath), NSTEMI (non-ST elevated myocardial infarction) (H)

## 2018-04-05 NOTE — PROGRESS NOTES
Dear Roula Mcdonnell,    We had the pleasure of seeing Mr. Feliberto Milligan in the [facility] today.    As you know, Mr. Milligan is a 89 year old male with history of atrial fibrillation on anticoagulation with warfarin, CKD stage 3, hyperlipidemia and known coronary artery disease.  In late June of 2017, he developed chest pain.  He presented to [facility] and was found to have an NSTEMI.  He underwent coronary angiography that demonstrated several blockages for which he received x4 LUCIANO (stented vessels include LAD, OM2 and x2 to the RPDA).  Follow up echocardiogram after that time is detailed below but demonstrated apical hypokinesis with a low-normal EF of 50%.      He has been going to cardiac rehab and he just finished this.  He does feel chronic fatigue and does have some shortness of breath with stairs although this is been stable.  He has not had any recurrence in his chest pain. He he has not had any stroke symptoms also feels very frustrated with aging process.     From a volume standpoint he does not note swelling, early satiety, abdominal distention.  Weight has been stable.  He denies palpitations, lightheadedness, syncope.  No orthopnea or PND.      He denies any stroke symptoms and he has not had any bleeding on Coumadin and plavix      PAST MEDICAL HISTORY:  Past Medical History:   Diagnosis Date     Asthma 11/8/2017     Atrial fibrillation (H)      Benign essential hypertension 11/8/2017     Chronic fatigue 11/8/2017     CKD (chronic kidney disease) stage 3, GFR 30-59 ml/min 11/8/2017     Coronary artery disease involving native heart 11/8/2017     Esophageal reflux 11/8/2017     History of cardioversion 5/31/2017     History of Coumadin therapy 11/8/2017     History of TIA (transient ischemic attack) and stroke/ remote 11/8/2017     Hypothyroidism 2/8/2016     NSTEMI (non-ST elevated myocardial infarction) (H) 6/26/2017     Pure hypercholesterolemia 11/8/2017      Reactive airway disease 11/8/2017     SOB (shortness of breath) 11/8/2017     Stented coronary artery/ mid LAD,OM2, PDA, all LUCIANO 6/29/2017       FAMILY HISTORY:  Mother's side with significant coronary disease    SOCIAL HISTORY:  , never smoker.  No significant EtOH.  Retired.  Career in business.      CURRENT MEDICATIONS:  Current Outpatient Prescriptions   Medication Sig Dispense Refill     spironolactone (ALDACTONE) 25 MG tablet Take 0.5 tablets (12.5 mg) by mouth daily 30 tablet 3     albuterol (VENTOLIN HFA) 108 (90 BASE) MCG/ACT Inhaler Inhale 2 puffs into the lungs every 4 hours as needed for shortness of breath / dyspnea or wheezing       atorvastatin (LIPITOR) 20 MG tablet Take 20 mg by mouth daily       clopidogrel (PLAVIX) 75 MG tablet Take 75 mg by mouth daily       fluticasone-salmeterol (ADVAIR DISKUS) 500-50 MCG/DOSE diskus inhaler Inhale 1 puff into the lungs every 12 hours       levothyroxine (SYNTHROID/LEVOTHROID) 50 MCG tablet Take 50 mcg by mouth daily       metoprolol (LOPRESSOR) 25 MG tablet Take 25 mg by mouth 2 times daily       montelukast (SINGULAIR) 10 MG tablet Take 10 mg by mouth At Bedtime       Multiple Vitamins-Minerals (MULTIVITAMIN ADULT PO) Take 1 tablet by mouth daily       nitroGLYcerin (NITROSTAT) 0.4 MG sublingual tablet Place 0.4 mg under the tongue every 5 minutes as needed for chest pain For chest pain place 1 tablet under the tongue every 5 minutes for 3 doses. If symptoms persist 5 minutes after 1st dose call 911.       ranitidine (ZANTAC) 150 MG tablet Take 150 mg by mouth 2 times daily       warfarin (COUMADIN) 5 MG tablet Take 5 mg by mouth daily Adjusted per INR clinic       furosemide (LASIX) 20 MG tablet Take 20 mg by mouth daily         ROS:   positive for fatigue, ringing in the ears, neuropathy symptoms, balance issues, vision problems,choking symptoms which are felt to be bronchospasm-otherwise 10 point review of systems was negative    EXAM:  /73  (BP Location: Left arm, Patient Position: Chair, Cuff Size: Adult Regular)  Pulse 51  Ht 1.829 m (6')  Wt 75.1 kg (165 lb 8 oz)  SpO2 96%  BMI 22.45 kg/m2  General: appears comfortable, alert and articulate, elderly   Head: normocephalic, atraumatic  Eyes: anicteric sclera, EOMI  Neck: no adenopathy  Orophyarynx: moist mucosa, no lesions, dentition intact  Heart: regular, S1/S2, no murmur, gallop, rub, estimated JVP at the level of the clavicle  Lungs: clear, no rales or wheezing  Abdomen: soft, non-tender, bowel sounds present, no hepatosplenomegaly  Extremities: no clubbing, cyanosis, no LE edema   Neurological: normal speech and affect, no gross motor deficits    Labs:  CBC RESULTS:  Lab Results   Component Value Date    WBC 6.9 11/21/2017    RBC 4.28 (L) 11/21/2017    HGB 13.5 11/21/2017    HCT 41.6 11/21/2017    MCV 97 11/21/2017    MCH 31.5 11/21/2017    MCHC 32.5 11/21/2017    RDW 13.3 11/21/2017     11/21/2017       CMP RESULTS:  Lab Results   Component Value Date     04/05/2018    POTASSIUM 4.0 04/05/2018    CHLORIDE 108 04/05/2018    CO2 29 04/05/2018    ANIONGAP 5 04/05/2018     (H) 04/05/2018    BUN 20 04/05/2018    CR 1.29 (H) 04/05/2018    GFRESTIMATED 52 (L) 04/05/2018    GFRESTBLACK 63 04/05/2018    BALBIR 8.7 04/05/2018    BILITOTAL 0.5 11/21/2017    ALBUMIN 3.6 11/21/2017    ALKPHOS 93 11/21/2017    ALT 54 11/21/2017    AST 30 11/21/2017        Echocardiogram:  Done at [facility], EF 50%, hypokinesis of the apical wall.  Moderate LA dilation, mild RA dilation, no significant stenosis of the aortic valve.  Moderate MR, RVSP was 40.  IVC dilated with respiratory variation, RAP estimated 8 mmHg.  LV diastolic diameter is 4 cm.    6/29/17: Coronary Angiogram:  PCI of the mid-LAD, OM2 and PDA with LUCIANO.  This was done at [facility].    Holter: 9/2017  No e/o arrhythmias aside from known afib that is rate controlled, done at [facility].    ECG  -performed in clinic today sinus rhythm with  PACs no ST-T wave changes    Spirometry done at [facility]:  SPIROMETRY / FARZANEH 10/20/2016 11/1/2016 1/31/2017 3/28/2017   FARZANEH 99 71 25 33   FVC Best 2.64 2.44 2.85 2.62   FVC % 61 65 76 63   FEV1 Best 1.45 1.54 1.75 1.69   FEV1 % 48 60 68 59   FEV1/FVC 54.9 63.1 61.4 64.5     Assessment and Plan:   Mr. Milligan is a 89-year-old male with coronary artery disease, NSTEMI over summer of 2017, low-normal EF at 50% with apical hypokinesis who presents for follow up of HFpEF.Today, his volume status is euvolemic.  He is NYHA class II. The plan at this point is volume control, blood pressure control, and continuing aldactone given the results of the TOP CAT trial, and he has  had revascularization. He is presently in sinus rhythm.      HFpEF with underlying CAD  Diuretic:  Lasix 20 mg daily -presently euvolemic  Aldosterone Antagonist:  Yes on aldactone 12.5 mg daily     CAD:  presently asymptomatic- s/p PCI in 6/2017.  On statin, beta blocker, BP controlled, on plavix/coumadin and therefore no ASA for now. Once he is 12 months out (June 2018) he will stop plavix and go to ASA 81 mg daily     Atrial Fibrillation, paroxysmal: :on metoprolol,  warfarin.      Balance issues-per primary      Lucretia Locke MD   of Medicine   [facility] Division of Cardiology         CC: Roula Mcdonnell MD [facility]

## 2018-04-05 NOTE — LETTER
4/5/2018      RE: Feliberto Milligan  [address] [place] [address]       Dear Colleague,    Thank you for the opportunity to participate in the care of your patient, Feliberto Milligan, at the [facility]. Please see a copy of my visit note below.      Dear Roula Mcdonnell,    We had the pleasure of seeing Mr. Feliberto Milligan in the [facility] today.    As you know, Mr. Milligan is a 89 year old male with history of atrial fibrillation on anticoagulation with warfarin, CKD stage 3, hyperlipidemia and known coronary artery disease.  In late June of 2017, he developed chest pain.  He presented to [facility] and was found to have an NSTEMI.  He underwent coronary angiography that demonstrated several blockages for which he received x4 LUCIANO (stented vessels include LAD, OM2 and x2 to the RPDA).  Follow up echocardiogram after that time is detailed below but demonstrated apical hypokinesis with a low-normal EF of 50%.      He has been going to cardiac rehab and he just finished this.  He does feel chronic fatigue and does have some shortness of breath with stairs although this is been stable.  He has not had any recurrence in his chest pain. He he has not had any stroke symptoms also feels very frustrated with aging process.     From a volume standpoint he does not note swelling, early satiety, abdominal distention.  Weight has been stable.  He denies palpitations, lightheadedness, syncope.  No orthopnea or PND.      He denies any stroke symptoms and he has not had any bleeding on Coumadin and plavix      PAST MEDICAL HISTORY:  Past Medical History:   Diagnosis Date     Asthma 11/8/2017     Atrial fibrillation (H)      Benign essential hypertension 11/8/2017     Chronic fatigue 11/8/2017     CKD (chronic kidney disease) stage 3, GFR 30-59 ml/min 11/8/2017     Coronary artery disease involving native heart 11/8/2017     Esophageal reflux  11/8/2017     History of cardioversion 5/31/2017     History of Coumadin therapy 11/8/2017     History of TIA (transient ischemic attack) and stroke/ remote 11/8/2017     Hypothyroidism 2/8/2016     NSTEMI (non-ST elevated myocardial infarction) (H) 6/26/2017     Pure hypercholesterolemia 11/8/2017     Reactive airway disease 11/8/2017     SOB (shortness of breath) 11/8/2017     Stented coronary artery/ mid LAD,OM2, PDA, all LUCIANO 6/29/2017       FAMILY HISTORY:  Mother's side with significant coronary disease    SOCIAL HISTORY:  , never smoker.  No significant EtOH.  Retired.  Career in business.      CURRENT MEDICATIONS:  Current Outpatient Prescriptions   Medication Sig Dispense Refill     spironolactone (ALDACTONE) 25 MG tablet Take 0.5 tablets (12.5 mg) by mouth daily 30 tablet 3     albuterol (VENTOLIN HFA) 108 (90 BASE) MCG/ACT Inhaler Inhale 2 puffs into the lungs every 4 hours as needed for shortness of breath / dyspnea or wheezing       atorvastatin (LIPITOR) 20 MG tablet Take 20 mg by mouth daily       clopidogrel (PLAVIX) 75 MG tablet Take 75 mg by mouth daily       fluticasone-salmeterol (ADVAIR DISKUS) 500-50 MCG/DOSE diskus inhaler Inhale 1 puff into the lungs every 12 hours       levothyroxine (SYNTHROID/LEVOTHROID) 50 MCG tablet Take 50 mcg by mouth daily       metoprolol (LOPRESSOR) 25 MG tablet Take 25 mg by mouth 2 times daily       montelukast (SINGULAIR) 10 MG tablet Take 10 mg by mouth At Bedtime       Multiple Vitamins-Minerals (MULTIVITAMIN ADULT PO) Take 1 tablet by mouth daily       nitroGLYcerin (NITROSTAT) 0.4 MG sublingual tablet Place 0.4 mg under the tongue every 5 minutes as needed for chest pain For chest pain place 1 tablet under the tongue every 5 minutes for 3 doses. If symptoms persist 5 minutes after 1st dose call 911.       ranitidine (ZANTAC) 150 MG tablet Take 150 mg by mouth 2 times daily       warfarin (COUMADIN) 5 MG tablet Take 5 mg by mouth daily Adjusted per INR  clinic       furosemide (LASIX) 20 MG tablet Take 20 mg by mouth daily         ROS:   positive for fatigue, ringing in the ears, neuropathy symptoms, balance issues, vision problems,choking symptoms which are felt to be bronchospasm-otherwise 10 point review of systems was negative    EXAM:  /73 (BP Location: Left arm, Patient Position: Chair, Cuff Size: Adult Regular)  Pulse 51  Ht 1.829 m (6')  Wt 75.1 kg (165 lb 8 oz)  SpO2 96%  BMI 22.45 kg/m2  General: appears comfortable, alert and articulate, elderly   Head: normocephalic, atraumatic  Eyes: anicteric sclera, EOMI  Neck: no adenopathy  Orophyarynx: moist mucosa, no lesions, dentition intact  Heart: regular, S1/S2, no murmur, gallop, rub, estimated JVP at the level of the clavicle  Lungs: clear, no rales or wheezing  Abdomen: soft, non-tender, bowel sounds present, no hepatosplenomegaly  Extremities: no clubbing, cyanosis, no LE edema   Neurological: normal speech and affect, no gross motor deficits    Labs:  CBC RESULTS:  Lab Results   Component Value Date    WBC 6.9 11/21/2017    RBC 4.28 (L) 11/21/2017    HGB 13.5 11/21/2017    HCT 41.6 11/21/2017    MCV 97 11/21/2017    MCH 31.5 11/21/2017    MCHC 32.5 11/21/2017    RDW 13.3 11/21/2017     11/21/2017       CMP RESULTS:  Lab Results   Component Value Date     04/05/2018    POTASSIUM 4.0 04/05/2018    CHLORIDE 108 04/05/2018    CO2 29 04/05/2018    ANIONGAP 5 04/05/2018     (H) 04/05/2018    BUN 20 04/05/2018    CR 1.29 (H) 04/05/2018    GFRESTIMATED 52 (L) 04/05/2018    GFRESTBLACK 63 04/05/2018    BALBIR 8.7 04/05/2018    BILITOTAL 0.5 11/21/2017    ALBUMIN 3.6 11/21/2017    ALKPHOS 93 11/21/2017    ALT 54 11/21/2017    AST 30 11/21/2017        Echocardiogram:  Done at [facility], EF 50%, hypokinesis of the apical wall.  Moderate LA dilation, mild RA dilation, no significant stenosis of the aortic valve.  Moderate MR, RVSP was 40.  IVC dilated with respiratory variation, RAP  estimated 8 mmHg.  LV diastolic diameter is 4 cm.    6/29/17: Coronary Angiogram:  PCI of the mid-LAD, OM2 and PDA with LUCIANO.  This was done at [facility].    Holter: 9/2017  No e/o arrhythmias aside from known afib that is rate controlled, done at [facility].    ECG  -performed in clinic today sinus rhythm with PACs no ST-T wave changes    Spirometry done at [facility]:  SPIROMETRY / FARZANEH 10/20/2016 11/1/2016 1/31/2017 3/28/2017   FARZANEH 99 71 25 33   FVC Best 2.64 2.44 2.85 2.62   FVC % 61 65 76 63   FEV1 Best 1.45 1.54 1.75 1.69   FEV1 % 48 60 68 59   FEV1/FVC 54.9 63.1 61.4 64.5     Assessment and Plan:   Mr. Milligan is a 89-year-old male with coronary artery disease, NSTEMI over summer of 2017, low-normal EF at 50% with apical hypokinesis who presents for follow up of HFpEF.Today, his volume status is euvolemic.  He is NYHA class II. The plan at this point is volume control, blood pressure control, and continuing aldactone given the results of the TOP CAT trial, and he has  had revascularization. He is presently in sinus rhythm.      HFpEF with underlying CAD  Diuretic:  Lasix 20 mg daily -presently euvolemic  Aldosterone Antagonist:  Yes on aldactone 12.5 mg daily     CAD:  presently asymptomatic- s/p PCI in 6/2017.  On statin, beta blocker, BP controlled, on plavix/coumadin and therefore no ASA for now. Once he is 12 months out (June 2018) he will stop plavix and go to ASA 81 mg daily     Atrial Fibrillation, paroxysmal: :on metoprolol,  warfarin.      Balance issues-per primary      Lucretia Locke MD   of Medicine   [facility] Division of Cardiology         CC: Roula Mcdonnell MD [facility]

## 2018-04-06 ENCOUNTER — TELEPHONE (OUTPATIENT)
Dept: CARDIOLOGY | Facility: CLINIC | Age: 83
End: 2018-04-06

## 2018-04-06 NOTE — TELEPHONE ENCOUNTER
Patient called with two questions he forgot to ask at yesterdays visit  1.  How much damage was done to his heart due to his heart attack  2. How much physical exercise can he handle. Can he do house work, etc..

## 2018-04-11 ENCOUNTER — TELEPHONE (OUTPATIENT)
Dept: CARDIOLOGY | Facility: CLINIC | Age: 83
End: 2018-04-11

## 2018-04-11 NOTE — TELEPHONE ENCOUNTER
Patient called the clinic with questions concerning his heart condition.   Have not been able to reach him by phone for 2 days.  Have left messages. Will await his call back

## 2018-04-17 ENCOUNTER — CARE COORDINATION (OUTPATIENT)
Dept: CARDIOLOGY | Facility: CLINIC | Age: 83
End: 2018-04-17

## 2018-04-17 DIAGNOSIS — I25.118 CORONARY ARTERY DISEASE OF NATIVE HEART WITH STABLE ANGINA PECTORIS, UNSPECIFIED VESSEL OR LESION TYPE (H): Primary | ICD-10-CM

## 2018-04-17 NOTE — PROGRESS NOTES
Returned patient called today after trying to reach him since last week.  He was wondering how much damaged his heart had from MI with stenting last June. Echo from [facility] from last summer shows EF 50 % with apical hypokineses.. He also questioned how much exercise he could do. He has completed cardiac rehab. and is now sighed up at [facility] for more activities.  I reassured him that all of this would be fine and explained it was important to keep fit and his core strong.  Pt. was to have Echo recently. He will call scheduling to arrange this. I will get back to him with results.

## 2018-06-16 DIAGNOSIS — I50.30 (HFPEF) HEART FAILURE WITH PRESERVED EJECTION FRACTION (H): ICD-10-CM

## 2018-06-19 RX ORDER — SPIRONOLACTONE 25 MG/1
TABLET ORAL
Qty: 45 TABLET | Refills: 3 | Status: SHIPPED | OUTPATIENT
Start: 2018-06-19 | End: 2019-05-25

## 2019-05-02 DIAGNOSIS — I50.30 (HFPEF) HEART FAILURE WITH PRESERVED EJECTION FRACTION (H): Primary | ICD-10-CM

## 2019-05-09 ENCOUNTER — HOSPITAL ENCOUNTER (OUTPATIENT)
Dept: CARDIOLOGY | Facility: CLINIC | Age: 84
Discharge: HOME | End: 2019-05-09
Attending: INTERNAL MEDICINE | Admitting: INTERNAL MEDICINE
Payer: COMMERCIAL

## 2019-05-09 ENCOUNTER — OFFICE VISIT (OUTPATIENT)
Dept: CARDIOLOGY | Facility: CLINIC | Age: 84
End: 2019-05-09
Attending: INTERNAL MEDICINE
Payer: COMMERCIAL

## 2019-05-09 VITALS
OXYGEN SATURATION: 97 % | BODY MASS INDEX: 22.9 KG/M2 | DIASTOLIC BLOOD PRESSURE: 86 MMHG | HEIGHT: 71 IN | WEIGHT: 163.6 LBS | SYSTOLIC BLOOD PRESSURE: 134 MMHG | HEART RATE: 61 BPM

## 2019-05-09 DIAGNOSIS — I50.30 (HFPEF) HEART FAILURE WITH PRESERVED EJECTION FRACTION (H): ICD-10-CM

## 2019-05-09 DIAGNOSIS — I25.118 CORONARY ARTERY DISEASE OF NATIVE HEART WITH STABLE ANGINA PECTORIS, UNSPECIFIED VESSEL OR LESION TYPE (H): ICD-10-CM

## 2019-05-09 DIAGNOSIS — I50.30 (HFPEF) HEART FAILURE WITH PRESERVED EJECTION FRACTION (H): Primary | ICD-10-CM

## 2019-05-09 DIAGNOSIS — Z95.5 STENTED CORONARY ARTERY: ICD-10-CM

## 2019-05-09 LAB
ANION GAP SERPL CALCULATED.3IONS-SCNC: 7 MMOL/L (ref 3–14)
BUN SERPL-MCNC: 20 MG/DL (ref 7–30)
CALCIUM SERPL-MCNC: 9 MG/DL (ref 8.5–10.1)
CHLORIDE SERPL-SCNC: 106 MMOL/L (ref 94–109)
CO2 SERPL-SCNC: 27 MMOL/L (ref 20–32)
CREAT SERPL-MCNC: 1.39 MG/DL (ref 0.66–1.25)
GFR SERPL CREATININE-BSD FRML MDRD: 44 ML/MIN/{1.73_M2}
GLUCOSE SERPL-MCNC: 103 MG/DL (ref 70–99)
NT-PROBNP SERPL-MCNC: 632 PG/ML (ref 0–450)
POTASSIUM SERPL-SCNC: 4.3 MMOL/L (ref 3.4–5.3)
SODIUM SERPL-SCNC: 140 MMOL/L (ref 133–144)

## 2019-05-09 PROCEDURE — 80048 BASIC METABOLIC PNL TOTAL CA: CPT | Performed by: INTERNAL MEDICINE

## 2019-05-09 PROCEDURE — 36415 COLL VENOUS BLD VENIPUNCTURE: CPT | Performed by: INTERNAL MEDICINE

## 2019-05-09 PROCEDURE — 99214 OFFICE O/P EST MOD 30 MIN: CPT | Mod: ZP | Performed by: INTERNAL MEDICINE

## 2019-05-09 PROCEDURE — G0463 HOSPITAL OUTPT CLINIC VISIT: HCPCS | Mod: ZF

## 2019-05-09 PROCEDURE — 83880 ASSAY OF NATRIURETIC PEPTIDE: CPT | Performed by: INTERNAL MEDICINE

## 2019-05-09 PROCEDURE — 93306 TTE W/DOPPLER COMPLETE: CPT

## 2019-05-09 PROCEDURE — 93306 TTE W/DOPPLER COMPLETE: CPT | Mod: 26 | Performed by: INTERNAL MEDICINE

## 2019-05-09 ASSESSMENT — MIFFLIN-ST. JEOR: SCORE: 1414.21

## 2019-05-09 ASSESSMENT — PAIN SCALES - GENERAL: PAINLEVEL: NO PAIN (0)

## 2019-05-09 NOTE — NURSING NOTE
Diet: Patient instructed regarding a heart failure healthy diet, including discussion of reduced fat and 2000 mg daily sodium restriction, daily weights, medication purpose and compliance, fluid restrictions and resources for patient and family to access for assistance with heart failure management.       Labs: Patient was given results of the laboratory testing obtained today and patient was instructed about when to return for the next laboratory testing.     Med Reconcile: Reviewed and verified all current medications with the patient. The updated medication list was printed and given to the patient.       Return Appointment: Patient given instructions regarding scheduling next clinic visit: 1 year follow up with Dr Locke with Echo prior    Patient stated he understood all health information given and agreed to call with further questions or concerns.     Floresita Aguilera RN

## 2019-05-09 NOTE — NURSING NOTE
Vitals completed successfully and medication reconciled.     Lillian Davila CMA  1:28 PM  Chief Complaint   Patient presents with     Follow Up     Return HF

## 2019-05-09 NOTE — LETTER
5/9/2019      RE: Feliberto Milligan  [address] [place] [address]       Dear Colleague,    Thank you for the opportunity to participate in the care of your patient, Feliberto Milligan, at the [facility] HEART [facility] at [facility]. Please see a copy of my visit note below.      May 9, 2019    Dear Roula Mcdonnell,    We had the pleasure of seeing Mr. Feliberto Milligan in the [facility] HFpEF  Clinic today.    As you know, Mr. Milligan is a 89 year old male with history of paroxysmal atrial fibrillation on anticoagulation with warfarin, CKD stage 3, hyperlipidemia and known coronary artery disease.      In late June of 2017, he developed chest pain.  He presented to [facility] and was found to have an NSTEMI.  He underwent coronary angiography that demonstrated several stenoses for which he received x4 LUCIANO (stented vessels include LAD, OM2 and x2 to the RPDA).  Follow up echocardiogram after that time is detailed below but demonstrated apical hypokinesis with a low-normal EF of 50%.  He has been on medical therapy for coronary artery disease since that time.     I first saw him around a year ago and his largest complaint is chronic fatigue.  He feels short of breath with even 1 block of walking.  He has been through both pulmonary and cardiac rehab.  He has lost 10 to 15 pounds unintentionally.  He feels very down and frustrated with the aging process.  He also feels that his balance has deteriorated in the last 2 years leading him to be less active. he did have one episode of chest pressure about a week ago that self resolved and was not in the setting of exertion and was thought to be atypical by his primary.     From a volume standpoint he does not note swelling, early satiety, abdominal distention. He denies palpitations, lightheadedness, syncope.  No orthopnea or PND.      He denies any stroke symptoms and he has not had any bleeding on Coumadin and plavix.     PAST MEDICAL  HISTORY:  Past Medical History:   Diagnosis Date     Asthma 11/8/2017     Atrial fibrillation (H)      Benign essential hypertension 11/8/2017     Chronic fatigue 11/8/2017     CKD (chronic kidney disease) stage 3, GFR 30-59 ml/min 11/8/2017     Coronary artery disease involving native heart 11/8/2017     Esophageal reflux 11/8/2017     History of cardioversion 5/31/2017     History of Coumadin therapy 11/8/2017     History of TIA (transient ischemic attack) and stroke/ remote 11/8/2017     Hypothyroidism 2/8/2016     NSTEMI (non-ST elevated myocardial infarction) (H) 6/26/2017     Pure hypercholesterolemia 11/8/2017     Reactive airway disease 11/8/2017     SOB (shortness of breath) 11/8/2017     Stented coronary artery/ mid LAD,OM2, PDA, all LUCIANO 6/29/2017       FAMILY HISTORY:  Mother's side with significant coronary disease    SOCIAL HISTORY:  , never smoker.  No significant EtOH.  Retired.  Career in business.      CURRENT MEDICATIONS:  Current Outpatient Medications   Medication Sig Dispense Refill     atorvastatin (LIPITOR) 20 MG tablet Take 20 mg by mouth daily       clopidogrel (PLAVIX) 75 MG tablet Take 1 tablet (75 mg) by mouth daily Stop after June 29th 2018, start Aspirin 81 mg 30 tablet      fluticasone-salmeterol (ADVAIR DISKUS) 500-50 MCG/DOSE diskus inhaler Inhale 1 puff into the lungs every 12 hours       levothyroxine (SYNTHROID/LEVOTHROID) 50 MCG tablet Take 50 mcg by mouth daily       metoprolol (LOPRESSOR) 25 MG tablet Take 25 mg by mouth 2 times daily       montelukast (SINGULAIR) 10 MG tablet Take 10 mg by mouth At Bedtime       Multiple Vitamins-Minerals (MULTIVITAMIN ADULT PO) Take 1 tablet by mouth daily       nitroGLYcerin (NITROSTAT) 0.4 MG sublingual tablet Place 0.4 mg under the tongue every 5 minutes as needed for chest pain For chest pain place 1 tablet under the tongue every 5 minutes for 3 doses. If symptoms persist 5 minutes after 1st dose call 911.       ranitidine (ZANTAC)  "150 MG tablet Take 150 mg by mouth 2 times daily       spironolactone (ALDACTONE) 25 MG tablet TAKE ONE-HALF TABLET BY MOUTH ONCE DAILY 45 tablet 3     warfarin (COUMADIN) 5 MG tablet Take 5 mg by mouth daily Adjusted per INR clinic       albuterol (VENTOLIN HFA) 108 (90 BASE) MCG/ACT Inhaler Inhale 2 puffs into the lungs every 4 hours as needed for shortness of breath / dyspnea or wheezing       furosemide (LASIX) 20 MG tablet Take 20 mg by mouth daily         ROS:   positive for fatigue, ringing in the ears, neuropathy symptoms, balance issues, vision problems,choking symptoms which are felt to be bronchospasm-otherwise 10 point review of systems was negative    EXAM:  /86 (BP Location: Right arm, Patient Position: Chair, Cuff Size: Adult Regular)   Pulse 61   Ht 1.803 m (5' 11\")   Wt 74.2 kg (163 lb 9.6 oz)   SpO2 97%   BMI 22.82 kg/m     General: appears comfortable, alert and articulate, elderly   Head: normocephalic, atraumatic  Eyes: anicteric sclera, EOMI  Neck: no adenopathy  Orophyarynx: moist mucosa, no lesions, dentition intact  Heart: regular, S1/S2, trace systolic murmur at the ape,  no gallop, rub, estimated JVP at the level of the clavicle  Lungs: clear, no rales or wheezing  Abdomen: soft, non-tender, bowel sounds present, no hepatosplenomegaly  Extremities: no clubbing, cyanosis, no LE edema   Neurological: normal speech and affect, no gross motor deficits    Labs:  CBC RESULTS:  Lab Results   Component Value Date    WBC 6.9 11/21/2017    RBC 4.28 (L) 11/21/2017    HGB 13.5 11/21/2017    HCT 41.6 11/21/2017    MCV 97 11/21/2017    MCH 31.5 11/21/2017    MCHC 32.5 11/21/2017    RDW 13.3 11/21/2017     11/21/2017       CMP RESULTS:  Lab Results   Component Value Date     05/09/2019    POTASSIUM 4.3 05/09/2019    CHLORIDE 106 05/09/2019    CO2 27 05/09/2019    ANIONGAP 7 05/09/2019     (H) 05/09/2019    BUN 20 05/09/2019    CR 1.39 (H) 05/09/2019    GFRESTIMATED 44 (L) " 05/09/2019    GFRESTBLACK 50 (L) 05/09/2019    BALBIR 9.0 05/09/2019    BILITOTAL 0.5 11/21/2017    ALBUMIN 3.6 11/21/2017    ALKPHOS 93 11/21/2017    ALT 54 11/21/2017    AST 30 11/21/2017        Echocardiogram:  Done at [facility], EF 50%, hypokinesis of the apical wall.  Moderate LA dilation, mild RA dilation, no significant stenosis of the aortic valve.  Moderate MR, RVSP was 40.  IVC dilated with respiratory variation, RAP estimated 8 mmHg.  LV diastolic diameter is 4 cm.    6/29/17: Coronary Angiogram:  PCI of the mid-LAD, OM2 and PDA with LUCIANO.  This was done at [facility].    Holter: 9/2017  No e/o arrhythmias aside from known afib that is rate controlled, done at [facility].    ECG  -performed in clinic today sinus rhythm with PACs no ST-T wave changes    Spirometry done at [facility]:  SPIROMETRY / FARZANEH 10/20/2016 11/1/2016 1/31/2017 3/28/2017   FARZANEH 99 71 25 33   FVC Best 2.64 2.44 2.85 2.62   FVC % 61 65 76 63   FEV1 Best 1.45 1.54 1.75 1.69   FEV1 % 48 60 68 59   FEV1/FVC 54.9 63.1 61.4 64.5       Assessment and Plan:   Mr. Milligan is a 89-year-old male with coronary artery disease, NSTEMI over summer of 2017, low-normal EF at 50% with apical hypokinesis who presents for follow up of HFpEF.Today, his volume status is euvolemic.  He is NYHA class III. The plan at this point is volume control, blood pressure control, and continuing aldactone given the results of the TOP CAT trial, and he has  had revascularization. He is presently in sinus rhythm.      I believe his decline in functional status is likely multifactorial ( aging, deconditioning, heart failure preserved ejection fraction, pulmonary disease) -I cannot exclude some underlying malignancy he has stopped routine cancer screening.     HFpEF with underlying CAD  Diuretic:  Lasix 20 mg daily -presently euvolemic  Aldosterone Antagonist:  Yes on aldactone 12.5 mg daily     CAD:  presently asymptomatic- s/p PCI in 6/2017.  On statin, beta blocker, BP controlled-  I had  recommended going to aspirin off of Plavix although he would like to continue Plavix    Atrial Fibrillation, paroxysmal: :on metoprolol,  warfarin.  Clinically in sinus rhythm    Balance issues-per primary    Mitral regurgitation: this is mild to moderate only       Lucretia Locke MD   of Medicine   [facility] Division of Cardiology         CC: Roula Mcdonnell MD [facility]

## 2019-05-09 NOTE — PATIENT INSTRUCTIONS
Cardiology Providers you saw during your visit:  Dr. Locke    Medication changes:  No changes    Follow up:  1. Follow up in 1 year with Dr Locke with a Echo and labs prior    Labs:  Results for DON THOMPSON (MRN [id number]) as of 5/9/2019 14:47   Ref. Range 5/9/2019 13:07   Sodium Latest Ref Range: 133 - 144 mmol/L 140   Potassium Latest Ref Range: 3.4 - 5.3 mmol/L 4.3   Chloride Latest Ref Range: 94 - 109 mmol/L 106   Carbon Dioxide Latest Ref Range: 20 - 32 mmol/L 27   Urea Nitrogen Latest Ref Range: 7 - 30 mg/dL 20   Creatinine Latest Ref Range: 0.66 - 1.25 mg/dL 1.39 (H)   GFR Estimate Latest Ref Range: >60 mL/min/1.73_m2 44 (L)   GFR Estimate If Black Latest Ref Range: >60 mL/min/1.73_m2 50 (L)   Calcium Latest Ref Range: 8.5 - 10.1 mg/dL 9.0   Anion Gap Latest Ref Range: 3 - 14 mmol/L 7   N-Terminal Pro Bnp Latest Ref Range: 0 - 450 pg/mL 632 (H)   Glucose Latest Ref Range: 70 - 99 mg/dL 103 (H)       Please call if you have :  1. Weight gain of more than 2 pounds in a day or 5 pounds in a week  2. Increased shortness of breath, swelling or bloating  3. Dizziness, lightheadedness   4. Any questions or concerns.       Follow the American Heart Association Diet and Lifestyle recommendations:  Limit saturated fat, trans fat, sodium, red meat, sweets and sugar-sweetened beverages. If you choose to eat red meat, compare labels and select the leanest cuts available.  Aim for at least 150 minutes of moderate physical activity or 75 minutes of vigorous physical activity - or an equal combination of both - each week.      During business hours: [phone], press option # 1 to be routed to the [facility] then option # 3 for medical questions to speak with a nurse      After hours, weekends or holidays: On Call Cardiologist- [phone]   option #4 and ask to speak to the on-call Cardiologist. Inform them you are a CORE/heart failure patient at the [facility].      Floresita Aguilera RN BSN  Cardiology Care  "Coordinator - Heart Failure/ C.O.R.E. Clinic  [facility]   Questions and [phone]   First press #1 for the University and then press #3 for \"Medical Advise\" to reach us Cardiology Nurses.      "

## 2019-05-09 NOTE — PROGRESS NOTES
May 9, 2019    Dear Roula Mcdonnell,    We had the pleasure of seeing Mr. Feliberto Milligan in the [facility] HFpEF  Clinic today.    As you know, Mr. Milligan is a 89 year old male with history of paroxysmal atrial fibrillation on anticoagulation with warfarin, CKD stage 3, hyperlipidemia and known coronary artery disease.      In late June of 2017, he developed chest pain.  He presented to [facility] and was found to have an NSTEMI.  He underwent coronary angiography that demonstrated several stenoses for which he received x4 LUCIANO (stented vessels include LAD, OM2 and x2 to the RPDA).  Follow up echocardiogram after that time is detailed below but demonstrated apical hypokinesis with a low-normal EF of 50%.  He has been on medical therapy for coronary artery disease since that time.     I first saw him around a year ago and his largest complaint is chronic fatigue.  He feels short of breath with even 1 block of walking.  He has been through both pulmonary and cardiac rehab.  He has lost 10 to 15 pounds unintentionally.  He feels very down and frustrated with the aging process.  He also feels that his balance has deteriorated in the last 2 years leading him to be less active. he did have one episode of chest pressure about a week ago that self resolved and was not in the setting of exertion and was thought to be atypical by his primary.     From a volume standpoint he does not note swelling, early satiety, abdominal distention. He denies palpitations, lightheadedness, syncope.  No orthopnea or PND.      He denies any stroke symptoms and he has not had any bleeding on Coumadin and plavix.     PAST MEDICAL HISTORY:  Past Medical History:   Diagnosis Date     Asthma 11/8/2017     Atrial fibrillation (H)      Benign essential hypertension 11/8/2017     Chronic fatigue 11/8/2017     CKD (chronic kidney disease) stage 3, GFR 30-59 ml/min 11/8/2017     Coronary artery disease involving native heart 11/8/2017      Esophageal reflux 11/8/2017     History of cardioversion 5/31/2017     History of Coumadin therapy 11/8/2017     History of TIA (transient ischemic attack) and stroke/ remote 11/8/2017     Hypothyroidism 2/8/2016     NSTEMI (non-ST elevated myocardial infarction) (H) 6/26/2017     Pure hypercholesterolemia 11/8/2017     Reactive airway disease 11/8/2017     SOB (shortness of breath) 11/8/2017     Stented coronary artery/ mid LAD,OM2, PDA, all LUCIANO 6/29/2017       FAMILY HISTORY:  Mother's side with significant coronary disease    SOCIAL HISTORY:  , never smoker.  No significant EtOH.  Retired.  Career in business.      CURRENT MEDICATIONS:  Current Outpatient Medications   Medication Sig Dispense Refill     atorvastatin (LIPITOR) 20 MG tablet Take 20 mg by mouth daily       clopidogrel (PLAVIX) 75 MG tablet Take 1 tablet (75 mg) by mouth daily Stop after June 29th 2018, start Aspirin 81 mg 30 tablet      fluticasone-salmeterol (ADVAIR DISKUS) 500-50 MCG/DOSE diskus inhaler Inhale 1 puff into the lungs every 12 hours       levothyroxine (SYNTHROID/LEVOTHROID) 50 MCG tablet Take 50 mcg by mouth daily       metoprolol (LOPRESSOR) 25 MG tablet Take 25 mg by mouth 2 times daily       montelukast (SINGULAIR) 10 MG tablet Take 10 mg by mouth At Bedtime       Multiple Vitamins-Minerals (MULTIVITAMIN ADULT PO) Take 1 tablet by mouth daily       nitroGLYcerin (NITROSTAT) 0.4 MG sublingual tablet Place 0.4 mg under the tongue every 5 minutes as needed for chest pain For chest pain place 1 tablet under the tongue every 5 minutes for 3 doses. If symptoms persist 5 minutes after 1st dose call 911.       ranitidine (ZANTAC) 150 MG tablet Take 150 mg by mouth 2 times daily       spironolactone (ALDACTONE) 25 MG tablet TAKE ONE-HALF TABLET BY MOUTH ONCE DAILY 45 tablet 3     warfarin (COUMADIN) 5 MG tablet Take 5 mg by mouth daily Adjusted per INR clinic       albuterol (VENTOLIN HFA) 108 (90 BASE) MCG/ACT Inhaler Inhale 2  "puffs into the lungs every 4 hours as needed for shortness of breath / dyspnea or wheezing       furosemide (LASIX) 20 MG tablet Take 20 mg by mouth daily         ROS:   positive for fatigue, ringing in the ears, neuropathy symptoms, balance issues, vision problems,choking symptoms which are felt to be bronchospasm-otherwise 10 point review of systems was negative    EXAM:  /86 (BP Location: Right arm, Patient Position: Chair, Cuff Size: Adult Regular)   Pulse 61   Ht 1.803 m (5' 11\")   Wt 74.2 kg (163 lb 9.6 oz)   SpO2 97%   BMI 22.82 kg/m    General: appears comfortable, alert and articulate, elderly   Head: normocephalic, atraumatic  Eyes: anicteric sclera, EOMI  Neck: no adenopathy  Orophyarynx: moist mucosa, no lesions, dentition intact  Heart: regular, S1/S2, trace systolic murmur at the ape,  no gallop, rub, estimated JVP at the level of the clavicle  Lungs: clear, no rales or wheezing  Abdomen: soft, non-tender, bowel sounds present, no hepatosplenomegaly  Extremities: no clubbing, cyanosis, no LE edema   Neurological: normal speech and affect, no gross motor deficits    Labs:  CBC RESULTS:  Lab Results   Component Value Date    WBC 6.9 11/21/2017    RBC 4.28 (L) 11/21/2017    HGB 13.5 11/21/2017    HCT 41.6 11/21/2017    MCV 97 11/21/2017    MCH 31.5 11/21/2017    MCHC 32.5 11/21/2017    RDW 13.3 11/21/2017     11/21/2017       CMP RESULTS:  Lab Results   Component Value Date     05/09/2019    POTASSIUM 4.3 05/09/2019    CHLORIDE 106 05/09/2019    CO2 27 05/09/2019    ANIONGAP 7 05/09/2019     (H) 05/09/2019    BUN 20 05/09/2019    CR 1.39 (H) 05/09/2019    GFRESTIMATED 44 (L) 05/09/2019    GFRESTBLACK 50 (L) 05/09/2019    BALBIR 9.0 05/09/2019    BILITOTAL 0.5 11/21/2017    ALBUMIN 3.6 11/21/2017    ALKPHOS 93 11/21/2017    ALT 54 11/21/2017    AST 30 11/21/2017        Echocardiogram:  Done at [facility], EF 50%, hypokinesis of the apical wall.  Moderate LA dilation, mild RA " dilation, no significant stenosis of the aortic valve.  Moderate MR, RVSP was 40.  IVC dilated with respiratory variation, RAP estimated 8 mmHg.  LV diastolic diameter is 4 cm.    6/29/17: Coronary Angiogram:  PCI of the mid-LAD, OM2 and PDA with LUCIANO.  This was done at [facility].    Holter: 9/2017  No e/o arrhythmias aside from known afib that is rate controlled, done at [facility].    ECG  -performed in clinic today sinus rhythm with PACs no ST-T wave changes    Spirometry done at [facility]:  SPIROMETRY / FARZANEH 10/20/2016 11/1/2016 1/31/2017 3/28/2017   FARZANEH 99 71 25 33   FVC Best 2.64 2.44 2.85 2.62   FVC % 61 65 76 63   FEV1 Best 1.45 1.54 1.75 1.69   FEV1 % 48 60 68 59   FEV1/FVC 54.9 63.1 61.4 64.5       Assessment and Plan:   Mr. Milligan is a 89-year-old male with coronary artery disease, NSTEMI over summer of 2017, low-normal EF at 50% with apical hypokinesis who presents for follow up of HFpEF.Today, his volume status is euvolemic.  He is NYHA class III. The plan at this point is volume control, blood pressure control, and continuing aldactone given the results of the TOP CAT trial, and he has  had revascularization. He is presently in sinus rhythm.      I believe his decline in functional status is likely multifactorial ( aging, deconditioning, heart failure preserved ejection fraction, pulmonary disease) -I cannot exclude some underlying malignancy he has stopped routine cancer screening.     HFpEF with underlying CAD  Diuretic:  Lasix 20 mg daily -presently euvolemic  Aldosterone Antagonist:  Yes on aldactone 12.5 mg daily     CAD:  presently asymptomatic- s/p PCI in 6/2017.  On statin, beta blocker, BP controlled-  I had recommended going to aspirin off of Plavix although he would like to continue Plavix    Atrial Fibrillation, paroxysmal: :on metoprolol,  warfarin.  Clinically in sinus rhythm    Balance issues-per primary    Mitral regurgitation: this is mild to moderate only       Lucretia Locke MD  Assistant  Professor of Medicine   [facility] Division of Cardiology         CC: Roula Mcdonnell MD [facility]

## 2019-05-25 DIAGNOSIS — I50.30 (HFPEF) HEART FAILURE WITH PRESERVED EJECTION FRACTION (H): ICD-10-CM

## 2019-05-30 NOTE — TELEPHONE ENCOUNTER
Results for DON THOMPSON (MRN [id number]) as of 5/30/2019 11:41   Ref. Range 9/26/2017 00:00 10/16/2017 00:00 11/3/2017 00:00 11/21/2017 09:42 4/5/2018 13:41 5/9/2019 13:07 5/9/2019 13:41   Creatinine Latest Ref Range: 0.66 - 1.25 mg/dL 1.44   1.46 (H) 1.29 (H) 1.39 (H)    Spironolactone 12.5 mg daily     Last Office Visit : 5/9/2019- plan continued spironolactone  Future Office visit: None, annual    Routing refill request to provider for review/approval because:  Abnormal creatinine, long term finding. Routed 90 day refill pended.

## 2019-05-31 ENCOUNTER — TELEPHONE (OUTPATIENT)
Dept: CARDIOLOGY | Facility: CLINIC | Age: 84
End: 2019-05-31

## 2019-05-31 RX ORDER — SPIRONOLACTONE 25 MG/1
12.5 TABLET ORAL DAILY
Qty: 45 TABLET | Refills: 3 | Status: SHIPPED | OUTPATIENT
Start: 2019-05-31 | End: 2020-05-04

## 2019-05-31 NOTE — TELEPHONE ENCOUNTER
Health Call Center    Phone Message    May a detailed message be left on voicemail: yes    Reason for Call: Medication Question or concern regarding medication   Prescription Clarification  Name of Medication: spironolactone (ALDACTONE) 25 MG tablet  Prescribing Provider: Dr. Locke   Pharmacy: [facility] [place]   What on the order needs clarification? Pt is wanting to speak with Dr. Locke or her nurse regarding why his prescription for this medication was canceled. Please give him a call back.          Action Taken: Message routed to:  [facility] ([facility] Cardiology

## 2020-05-02 DIAGNOSIS — I50.30 (HFPEF) HEART FAILURE WITH PRESERVED EJECTION FRACTION (H): ICD-10-CM

## 2020-05-04 NOTE — TELEPHONE ENCOUNTER
spironolactone (ALDACTONE) 25 MG tablet      Last Written Prescription Date:  5/31/2019  Last Fill Quantity: 45,   # refills: 3  Last Office Visit : 52/9/2019  Future Office visit:  none    Routing refill request to provider for review/approval because:  Failed protocol: abnormal lab- Creatinine.  - appointment now due- message sent to clinic coordinator for scheduling.

## 2020-05-05 RX ORDER — SPIRONOLACTONE 25 MG/1
12.5 TABLET ORAL DAILY
Qty: 45 TABLET | Refills: 0 | Status: SHIPPED | OUTPATIENT
Start: 2020-05-05 | End: 2020-07-22

## 2020-07-18 DIAGNOSIS — I50.30 (HFPEF) HEART FAILURE WITH PRESERVED EJECTION FRACTION (H): ICD-10-CM

## 2020-07-22 RX ORDER — SPIRONOLACTONE 25 MG/1
12.5 TABLET ORAL DAILY
Qty: 45 TABLET | Refills: 0 | Status: SHIPPED | OUTPATIENT
Start: 2020-07-22 | End: 2020-10-20

## 2020-07-22 NOTE — TELEPHONE ENCOUNTER
Last Clinic Visit: 5/9/2019  [facility]  Scheduling has been notified to contact the pt for appointment.  Labs overdue - clinic notified.

## 2020-10-17 DIAGNOSIS — I50.30 (HFPEF) HEART FAILURE WITH PRESERVED EJECTION FRACTION (H): ICD-10-CM

## 2020-10-20 NOTE — TELEPHONE ENCOUNTER
Spironolactone 25 MG Oral Tablet   Last Written Prescription Date:  7/22/2020  Last Fill Quantity: 45,   # refills: 0  Last Office Visit : 5/19/2019  Future Office visit:  None  Routing refill request to provider for review/approval because:  Third request        Office visit and labs due:  Cr, Na, K+  Refer to clinic for review       Mile Contreras RN  Central Triage Red Flags/Med Refills

## 2020-10-22 RX ORDER — SPIRONOLACTONE 25 MG/1
12.5 TABLET ORAL DAILY
Qty: 45 TABLET | Refills: 0 | Status: SHIPPED | OUTPATIENT
Start: 2020-10-22 | End: 2021-01-20

## 2021-01-16 DIAGNOSIS — I50.30 (HFPEF) HEART FAILURE WITH PRESERVED EJECTION FRACTION (H): ICD-10-CM

## 2021-01-20 NOTE — TELEPHONE ENCOUNTER
Spironolactone 25 MG Oral Tablet   Last Written Prescription Date:  10/22/2020  Last Fill Quantity: 45,   # refills: 0  Last Office Visit : 5/9/2019  Future Office visit:  None    Routing refill request to provider for review/approval because:  Second Request       Office visit and Labs due:  Na, Cr, K+  30 day pended       Clinic notified       Mile Contreras RN  Central Triage Red Flags/Med Refills

## 2021-01-21 RX ORDER — SPIRONOLACTONE 25 MG/1
12.5 TABLET ORAL DAILY
Qty: 45 TABLET | Refills: 0 | Status: SHIPPED | OUTPATIENT
Start: 2021-01-21

## 2024-08-19 ENCOUNTER — LAB REQUISITION (OUTPATIENT)
Dept: LAB | Facility: CLINIC | Age: 89
End: 2024-08-19
Payer: COMMERCIAL

## 2024-08-19 DIAGNOSIS — E11.9 TYPE 2 DIABETES MELLITUS WITHOUT COMPLICATIONS (H): ICD-10-CM

## 2024-08-19 DIAGNOSIS — I10 ESSENTIAL (PRIMARY) HYPERTENSION: ICD-10-CM

## 2024-08-21 LAB
ANION GAP SERPL CALCULATED.3IONS-SCNC: 9 MMOL/L (ref 7–15)
BUN SERPL-MCNC: 34.3 MG/DL (ref 8–23)
CALCIUM SERPL-MCNC: 8.3 MG/DL (ref 8.8–10.4)
CHLORIDE SERPL-SCNC: 107 MMOL/L (ref 98–107)
CREAT SERPL-MCNC: 1.53 MG/DL (ref 0.67–1.17)
EGFRCR SERPLBLD CKD-EPI 2021: 41 ML/MIN/1.73M2
ERYTHROCYTE [DISTWIDTH] IN BLOOD BY AUTOMATED COUNT: 17.2 % (ref 10–15)
GLUCOSE SERPL-MCNC: 100 MG/DL (ref 70–99)
HCO3 SERPL-SCNC: 22 MMOL/L (ref 22–29)
HCT VFR BLD AUTO: 27.9 % (ref 40–53)
HGB BLD-MCNC: 9 G/DL (ref 13.3–17.7)
MCH RBC QN AUTO: 33.1 PG (ref 26.5–33)
MCHC RBC AUTO-ENTMCNC: 32.3 G/DL (ref 31.5–36.5)
MCV RBC AUTO: 103 FL (ref 78–100)
PLATELET # BLD AUTO: 234 10E3/UL (ref 150–450)
POTASSIUM SERPL-SCNC: 4.3 MMOL/L (ref 3.4–5.3)
RBC # BLD AUTO: 2.72 10E6/UL (ref 4.4–5.9)
SODIUM SERPL-SCNC: 138 MMOL/L (ref 135–145)
WBC # BLD AUTO: 9 10E3/UL (ref 4–11)

## 2024-08-21 PROCEDURE — 36415 COLL VENOUS BLD VENIPUNCTURE: CPT | Mod: ORL | Performed by: INTERNAL MEDICINE

## 2024-08-21 PROCEDURE — 80048 BASIC METABOLIC PNL TOTAL CA: CPT | Mod: ORL | Performed by: INTERNAL MEDICINE

## 2024-08-21 PROCEDURE — P9604 ONE-WAY ALLOW PRORATED TRIP: HCPCS | Mod: ORL | Performed by: INTERNAL MEDICINE

## 2024-08-21 PROCEDURE — 85027 COMPLETE CBC AUTOMATED: CPT | Mod: ORL | Performed by: INTERNAL MEDICINE

## 2024-08-26 ENCOUNTER — LAB REQUISITION (OUTPATIENT)
Dept: LAB | Facility: CLINIC | Age: 89
End: 2024-08-26
Payer: COMMERCIAL

## 2024-08-26 DIAGNOSIS — D64.9 ANEMIA, UNSPECIFIED: ICD-10-CM

## 2024-08-27 LAB
ANION GAP SERPL CALCULATED.3IONS-SCNC: 9 MMOL/L (ref 7–15)
BUN SERPL-MCNC: 25.2 MG/DL (ref 8–23)
CALCIUM SERPL-MCNC: 8.5 MG/DL (ref 8.8–10.4)
CHLORIDE SERPL-SCNC: 108 MMOL/L (ref 98–107)
CREAT SERPL-MCNC: 1.44 MG/DL (ref 0.67–1.17)
EGFRCR SERPLBLD CKD-EPI 2021: 44 ML/MIN/1.73M2
ERYTHROCYTE [DISTWIDTH] IN BLOOD BY AUTOMATED COUNT: 18.9 % (ref 10–15)
GLUCOSE SERPL-MCNC: 140 MG/DL (ref 70–99)
HCO3 SERPL-SCNC: 24 MMOL/L (ref 22–29)
HCT VFR BLD AUTO: 32.9 % (ref 40–53)
HGB BLD-MCNC: 10.3 G/DL (ref 13.3–17.7)
MCH RBC QN AUTO: 33.2 PG (ref 26.5–33)
MCHC RBC AUTO-ENTMCNC: 31.3 G/DL (ref 31.5–36.5)
MCV RBC AUTO: 106 FL (ref 78–100)
PLATELET # BLD AUTO: 263 10E3/UL (ref 150–450)
POTASSIUM SERPL-SCNC: 4.4 MMOL/L (ref 3.4–5.3)
RBC # BLD AUTO: 3.1 10E6/UL (ref 4.4–5.9)
SODIUM SERPL-SCNC: 141 MMOL/L (ref 135–145)
WBC # BLD AUTO: 8.4 10E3/UL (ref 4–11)

## 2024-08-27 PROCEDURE — P9604 ONE-WAY ALLOW PRORATED TRIP: HCPCS | Mod: ORL | Performed by: INTERNAL MEDICINE

## 2024-08-27 PROCEDURE — 80048 BASIC METABOLIC PNL TOTAL CA: CPT | Mod: ORL | Performed by: INTERNAL MEDICINE

## 2024-08-27 PROCEDURE — 85027 COMPLETE CBC AUTOMATED: CPT | Mod: ORL | Performed by: INTERNAL MEDICINE

## 2024-08-27 PROCEDURE — 36415 COLL VENOUS BLD VENIPUNCTURE: CPT | Mod: ORL | Performed by: INTERNAL MEDICINE

## 2024-09-03 ENCOUNTER — LAB REQUISITION (OUTPATIENT)
Dept: LAB | Facility: CLINIC | Age: 89
End: 2024-09-03
Payer: COMMERCIAL

## 2024-09-03 DIAGNOSIS — D64.9 ANEMIA, UNSPECIFIED: ICD-10-CM

## 2024-09-04 LAB — HGB BLD-MCNC: 10.9 G/DL (ref 13.3–17.7)

## 2024-09-04 PROCEDURE — 36415 COLL VENOUS BLD VENIPUNCTURE: CPT | Mod: ORL | Performed by: NURSE PRACTITIONER

## 2024-09-04 PROCEDURE — 85018 HEMOGLOBIN: CPT | Mod: ORL | Performed by: NURSE PRACTITIONER

## 2024-09-04 PROCEDURE — P9604 ONE-WAY ALLOW PRORATED TRIP: HCPCS | Mod: ORL | Performed by: NURSE PRACTITIONER

## 2024-09-23 ENCOUNTER — LAB REQUISITION (OUTPATIENT)
Dept: LAB | Facility: CLINIC | Age: 89
End: 2024-09-23
Payer: COMMERCIAL

## 2024-09-23 DIAGNOSIS — D64.9 ANEMIA, UNSPECIFIED: ICD-10-CM

## 2024-09-24 LAB
ERYTHROCYTE [DISTWIDTH] IN BLOOD BY AUTOMATED COUNT: 14.6 % (ref 10–15)
HCT VFR BLD AUTO: 39.1 % (ref 40–53)
HGB BLD-MCNC: 12.8 G/DL (ref 13.3–17.7)
MCH RBC QN AUTO: 33.2 PG (ref 26.5–33)
MCHC RBC AUTO-ENTMCNC: 32.7 G/DL (ref 31.5–36.5)
MCV RBC AUTO: 102 FL (ref 78–100)
PLATELET # BLD AUTO: 239 10E3/UL (ref 150–450)
RBC # BLD AUTO: 3.85 10E6/UL (ref 4.4–5.9)
WBC # BLD AUTO: 9.1 10E3/UL (ref 4–11)

## 2024-09-24 PROCEDURE — 36415 COLL VENOUS BLD VENIPUNCTURE: CPT | Mod: ORL | Performed by: NURSE PRACTITIONER

## 2024-09-24 PROCEDURE — 85027 COMPLETE CBC AUTOMATED: CPT | Mod: ORL | Performed by: NURSE PRACTITIONER

## 2024-09-24 PROCEDURE — P9604 ONE-WAY ALLOW PRORATED TRIP: HCPCS | Mod: ORL | Performed by: NURSE PRACTITIONER

## 2024-09-30 ENCOUNTER — LAB REQUISITION (OUTPATIENT)
Dept: LAB | Facility: CLINIC | Age: 89
End: 2024-09-30
Payer: COMMERCIAL

## 2024-09-30 DIAGNOSIS — D64.9 ANEMIA, UNSPECIFIED: ICD-10-CM

## 2024-10-01 LAB — HGB BLD-MCNC: 11.4 G/DL (ref 13.3–17.7)

## 2024-10-01 PROCEDURE — P9604 ONE-WAY ALLOW PRORATED TRIP: HCPCS | Mod: ORL | Performed by: NURSE PRACTITIONER

## 2024-10-01 PROCEDURE — 36415 COLL VENOUS BLD VENIPUNCTURE: CPT | Mod: ORL | Performed by: NURSE PRACTITIONER

## 2024-10-01 PROCEDURE — 85018 HEMOGLOBIN: CPT | Mod: ORL | Performed by: NURSE PRACTITIONER

## 2024-10-10 ENCOUNTER — MEDICAL CORRESPONDENCE (OUTPATIENT)
Dept: HEALTH INFORMATION MANAGEMENT | Facility: CLINIC | Age: 89
End: 2024-10-10
Payer: COMMERCIAL